# Patient Record
Sex: MALE | Race: WHITE | Employment: UNEMPLOYED | URBAN - METROPOLITAN AREA
[De-identification: names, ages, dates, MRNs, and addresses within clinical notes are randomized per-mention and may not be internally consistent; named-entity substitution may affect disease eponyms.]

---

## 2017-02-28 ENCOUNTER — OFFICE VISIT (OUTPATIENT)
Dept: FAMILY MEDICINE CLINIC | Age: 57
End: 2017-02-28

## 2017-02-28 VITALS
HEART RATE: 82 BPM | BODY MASS INDEX: 41.75 KG/M2 | HEIGHT: 73 IN | TEMPERATURE: 97.2 F | WEIGHT: 315 LBS | OXYGEN SATURATION: 98 % | SYSTOLIC BLOOD PRESSURE: 172 MMHG | DIASTOLIC BLOOD PRESSURE: 104 MMHG | RESPIRATION RATE: 15 BRPM

## 2017-02-28 DIAGNOSIS — R06.83 SNORING: ICD-10-CM

## 2017-02-28 DIAGNOSIS — E11.9 DM TYPE 2, GOAL HBA1C < 7% (HCC): Primary | ICD-10-CM

## 2017-02-28 DIAGNOSIS — L30.9 DERMATITIS: ICD-10-CM

## 2017-02-28 DIAGNOSIS — I25.810 CORONARY ARTERY DISEASE INVOLVING CORONARY BYPASS GRAFT OF NATIVE HEART WITHOUT ANGINA PECTORIS: ICD-10-CM

## 2017-02-28 DIAGNOSIS — E11.29 MICROALBUMINURIA DUE TO TYPE 2 DIABETES MELLITUS (HCC): ICD-10-CM

## 2017-02-28 DIAGNOSIS — I10 ESSENTIAL HYPERTENSION: ICD-10-CM

## 2017-02-28 DIAGNOSIS — J43.8 OTHER EMPHYSEMA (HCC): ICD-10-CM

## 2017-02-28 DIAGNOSIS — R80.9 MICROALBUMINURIA DUE TO TYPE 2 DIABETES MELLITUS (HCC): ICD-10-CM

## 2017-02-28 DIAGNOSIS — E78.00 HYPERCHOLESTEREMIA: ICD-10-CM

## 2017-02-28 DIAGNOSIS — I10 ESSENTIAL HYPERTENSION WITH GOAL BLOOD PRESSURE LESS THAN 140/90: ICD-10-CM

## 2017-02-28 RX ORDER — ALBUTEROL SULFATE 90 UG/1
2 AEROSOL, METERED RESPIRATORY (INHALATION)
Qty: 1 INHALER | Refills: 3 | Status: SHIPPED | OUTPATIENT
Start: 2017-02-28

## 2017-02-28 RX ORDER — CLOBETASOL PROPIONATE 0.5 MG/G
OINTMENT TOPICAL 2 TIMES DAILY
Qty: 60 G | Refills: 2 | Status: SHIPPED | OUTPATIENT
Start: 2017-02-28

## 2017-02-28 NOTE — PROGRESS NOTES
1. Have you been to the ER, urgent care clinic since your last visit? Hospitalized since your last visit? No    2. Have you seen or consulted any other health care providers outside of the [address] [place] [address] since your last visit? Include any pap smears or colon screening.  No      Patient has not eaten , checked his sugars, or took any meds today

## 2017-02-28 NOTE — MR AVS SNAPSHOT
Visit Information Date & Time Provider Department Dept. Phone Encounter #  
 2/28/2017  3:15 PM Kait Johnson, [address] [facility] [phone] Follow-up Instructions Return in about 3 weeks (around 3/21/2017). Upcoming Health Maintenance Date Due Hepatitis C Screening 1960 FOOT EXAM Q1 9/24/1970 EYE EXAM RETINAL OR DILATED Q1 9/24/1970 Pneumococcal 19-64 Medium Risk (1 of 1 - PPSV23) 9/24/1979 DTaP/Tdap/Td series (1 - Tdap) 9/24/1981 FOBT Q 1 YEAR AGE 50-75 9/24/2010 INFLUENZA AGE 9 TO ADULT 8/1/2016 HEMOGLOBIN A1C Q6M 5/17/2017 MICROALBUMIN Q1 8/24/2017 LIPID PANEL Q1 8/24/2017 Allergies as of 2/28/2017  Review Complete On: 2/28/2017 By: Kait Johnson MD  
 No Known Allergies Current Immunizations  Never Reviewed No immunizations on file. Not reviewed this visit You Were Diagnosed With   
  
 Codes Comments DM type 2, goal HbA1c < 7% ([facility])    -  Primary ICD-10-CM: E11.9 ICD-9-CM: 250.00 Essential hypertension with goal blood pressure less than 140/90     ICD-10-CM: I10 
ICD-9-CM: 401.9 Microalbuminuria due to type 2 diabetes mellitus ([facility]     ICD-10-CM: E11.29, R80.9 ICD-9-CM: 250.00, 791.0 Coronary artery disease involving coronary bypass graft of native heart without angina pectoris     ICD-10-CM: I25.810 ICD-9-CM: 414.05 Essential hypertension     ICD-10-CM: I10 
ICD-9-CM: 401.9 Hypercholesteremia     ICD-10-CM: E78.00 ICD-9-CM: 272.0 Snoring     ICD-10-CM: R06.83 
ICD-9-CM: 786.09 Other emphysema ([facility]     ICD-10-CM: J43.8 ICD-9-CM: 492.8 Dermatitis     ICD-10-CM: L30.9 ICD-9-CM: 692.9 Vitals BP  
  
  
  
  
  
 (!) 172/104 (BP 1 Location: Right arm) Vitals History BMI and BSA Data Body Mass Index Body Surface Area  
 42.48 kg/m 2 2.74 m 2 Your Updated Medication List  
  
   
 This list is accurate as of: 2/28/17  4:23 PM.  Always use your most recent med list.  
  
  
  
  
 albuterol 90 mcg/actuation inhaler Commonly known as:  PROVENTIL HFA, VENTOLIN HFA, PROAIR HFA Take 2 Puffs by inhalation every six (6) hours as needed for Wheezing. aspirin delayed-release 81 mg tablet Take  by mouth daily. atorvastatin 80 mg tablet Commonly known as:  LIPITOR Take 1 Tab by mouth daily. cholecalciferol 1,000 unit tablet Commonly known as:  VITAMIN D3 Take 2 Tabs by mouth daily. clobetasol 0.05 % ointment Commonly known as:  Ulysses Donato Apply  to affected area two (2) times a day. FLUoxetine 20 mg capsule Commonly known as:  PROzac Take 1 Cap by mouth daily. insulin glargine 100 unit/mL injection Commonly known as:  LANTUS  
65 Units by SubCUTAneous route daily. Insulin Syringes (Disposable) 1 mL Syrg Use once a day. Diagnosis 250.00  
  
 lisinopril 40 mg tablet Commonly known as:  Donnald Code Take 1 Tab by mouth daily. metFORMIN 1,000 mg tablet Commonly known as:  GLUCOPHAGE Take 1 Tab by mouth two (2) times daily (with meals). metoprolol tartrate 25 mg tablet Commonly known as:  LOPRESSOR Take 1 Tab by mouth two (2) times a day. sAXagliptin 2.5 mg tablet Commonly known as:  ONGLYZA Take 1 Tab by mouth daily. Prescriptions Printed Refills  
 albuterol (PROVENTIL HFA, VENTOLIN HFA, PROAIR HFA) 90 mcg/actuation inhaler 3 Sig: Take 2 Puffs by inhalation every six (6) hours as needed for Wheezing. Class: Print Route: Inhalation  
 clobetasol (TEMOVATE) 0.05 % ointment 2 Sig: Apply  to affected area two (2) times a day. Class: Print Route: Topical  
  
We Performed the Following REFERRAL TO SLEEP STUDIES [REF99 Custom] Comments:  
 Please evaluate patient for snoring Follow-up Instructions Return in about 3 weeks (around 3/21/2017). To-Do List   
 02/28/2017 Lab:  CBC WITH AUTOMATED DIFF   
  
 02/28/2017 Lab:  HEMOGLOBIN A1C WITH EAG   
  
 02/28/2017 Lab:  LIPID PANEL   
  
 02/28/2017 Lab:  METABOLIC PANEL, COMPREHENSIVE Referral Information Referral ID Referred By Referred To  
  
 [id number] Ellen ERWIN Not Available Visits Status Start Date End Date 1 New Request 2/28/17 2/28/18 If your referral has a status of pending review or denied, additional information will be sent to support the outcome of this decision. Introducing [facility]! Dear Armani Corona: 
Thank you for requesting a [facility] account. Our records indicate that you already have an active [facility] account. You can access your account anytime at [email or web address] Did you know that you can access your hospital and ER discharge instructions at any time in [facility]? You can also review all of your test results from your hospital stay or ER visit. Additional Information If you have questions, please visit the Frequently Asked Questions section of the [facility] website at [email or web address] Remember, [facility] is NOT to be used for urgent needs. For medical emergencies, dial 911. Now available from your iPhone and Android! Please provide this summary of care documentation to your next provider. Your primary care clinician is listed as Jake Brooks. If you have any questions after today's visit, please call [phone].

## 2017-02-28 NOTE — PROGRESS NOTES
Duc Jimenez is a 64 y.o.  male and presents with     Chief Complaint   Patient presents with    Diabetes    Hypertension    Cholesterol Problem    Coronary Artery Disease    Sleep Problem    Rash    Nicotine Dependence       Pt feels stressed today as the pace where he works as [employer] is being sold and he is not sure what is going to happen with his jib and stay in 2 weeks. He did not take any meds today. He had slight headaches that has resolved  Pt says sugars are doing better. Pt does snore . He has been smoking for 30 years. Has mild CUEVAS. Pt has rash on his arms ,he is not sure what has caused it. Past Medical History:   Diagnosis Date    Coronary artery disease     Depression     DM (diabetes mellitus screen)     Heart disease     Hyperlipidemia     Kidney calculi      Past Surgical History:   Procedure Laterality Date    HX OTHER SURGICAL      pt stated he had a triple bypass    HX OTHER SURGICAL      excision of right benign adrenal mass      Current Outpatient Prescriptions   Medication Sig    albuterol (PROVENTIL HFA, VENTOLIN HFA, PROAIR HFA) 90 mcg/actuation inhaler Take 2 Puffs by inhalation every six (6) hours as needed for Wheezing.  clobetasol (TEMOVATE) 0.05 % ointment Apply  to affected area two (2) times a day.  sAXagliptin (ONGLYZA) 2.5 mg tablet Take 1 Tab by mouth daily.  lisinopril (PRINIVIL, ZESTRIL) 40 mg tablet Take 1 Tab by mouth daily.  insulin glargine (LANTUS) 100 unit/mL injection 65 Units by SubCUTAneous route daily.  FLUoxetine (PROZAC) 20 mg capsule Take 1 Cap by mouth daily.  Cholecalciferol, Vitamin D3, (VITAMIN D3) 1,000 unit tablet Take 2 Tabs by mouth daily.  Insulin Syringes, Disposable, 1 mL syrg Use once a day. Diagnosis 250.00    atorvastatin (LIPITOR) 80 mg tablet Take 1 Tab by mouth daily.  metFORMIN (GLUCOPHAGE) 1,000 mg tablet Take 1 Tab by mouth two (2) times daily (with meals).     metoprolol tartrate (LOPRESSOR) 25 mg tablet Take 1 Tab by mouth two (2) times a day.  aspirin delayed-release 81 mg tablet Take  by mouth daily. No current facility-administered medications for this visit. Health Maintenance   Topic Date Due    Hepatitis C Screening  1960    FOOT EXAM Q1  09/24/1970    EYE EXAM RETINAL OR DILATED Q1  09/24/1970    Pneumococcal 19-64 Medium Risk (1 of 1 - PPSV23) 09/24/1979    DTaP/Tdap/Td series (1 - Tdap) 09/24/1981    FOBT Q 1 YEAR AGE 50-75  09/24/2010    INFLUENZA AGE 9 TO ADULT  08/01/2016    HEMOGLOBIN A1C Q6M  05/17/2017    MICROALBUMIN Q1  08/24/2017    LIPID PANEL Q1  08/24/2017       There is no immunization history on file for this patient. No LMP for male patient. Allergies and Intolerances:   No Known Allergies    Family History:   No family history on file. Social History:   He  reports that he has been smoking. He has been smoking about 0.25 packs per day. He has never used smokeless tobacco.  He  reports that he does not drink alcohol.             Review of Systems: pos pofr snoring  General: negative for - chills, fatigue, fever, weight change  Psych: negative for - anxiety, depression, irritability or mood swings  ENT: negative for - headaches, hearing change, nasal congestion, oral lesions, sneezing or sore throat  Heme/ Lymph: negative for - bleeding problems, bruising, pallor or swollen lymph nodes  Endo: negative for - hot flashes, polydipsia/polyuria or temperature intolerance  Resp: negative for - cough, pos for shortness of breath or wheezing  CV: negative for - chest pain, edema or palpitations  GI: negative for - abdominal pain, change in bowel habits, constipation, diarrhea or nausea/vomiting  : negative for - dysuria, hematuria, incontinence, pelvic pain or vulvar/vaginal symptoms  MSK: negative for - joint pain, joint swelling or muscle pain  Neuro: negative for - confusion, headaches, seizures or weakness  Derm: negative for - dry skin, hair changes, rash or skin lesion changes,pso for rash on arms          Physical:   Vitals:   Vitals:    02/28/17 1519 02/28/17 1525   BP: (!) 165/100 (!) 172/104   Pulse: 82    Resp: 15    Temp: 97.2 °F (36.2 °C)    TempSrc: Oral    SpO2: 98%    Weight: 322 lb (146.1 kg)    Height: 6' 1\" (1.854 m)            Exam:   HEENT- atraumatic,normocephalic, awake, oriented, well nourished,obese  Neck - supple,no enlarged lymph nodes, no JVD, no thyromegaly  Chest- CTA, no rhonchi, no crackles,mild wheezing  Heart- rrr, no murmurs / gallop/rub  Abdomen- soft,BS+,NT, no hepatosplenomegaly  Ext - no c/c/edema   Neuro- no focal deficits. Power 5/5 all extremities  Skin - scaly rash on arms , ? Eczema versus psoriasis          Review of Data:   LABS:   Lab Results   Component Value Date/Time    WBC 8.2 08/24/2016 10:00 AM    HGB 13.7 08/24/2016 10:00 AM    HCT 42.6 08/24/2016 10:00 AM    PLATELET 864 57/00/0798 10:00 AM     Lab Results   Component Value Date/Time    Sodium 142 12/15/2016 02:41 PM    Potassium 4.2 12/15/2016 02:41 PM    Chloride 108 12/15/2016 02:41 PM    CO2 30 12/15/2016 02:41 PM    Glucose 162 12/15/2016 02:41 PM    BUN 13 12/15/2016 02:41 PM    Creatinine 0.78 12/15/2016 02:41 PM     Lab Results   Component Value Date/Time    Cholesterol, total 120 08/24/2016 10:00 AM    HDL Cholesterol 34 08/24/2016 10:00 AM    LDL, calculated 23.8 08/24/2016 10:00 AM    Triglyceride 311 08/24/2016 10:00 AM     No results found for: GPT        Impression / Plan:        ICD-10-CM ICD-9-CM    1. DM type 2, goal HbA1c < 7% (HCC) E11.9 250.00 CBC WITH AUTOMATED DIFF      METABOLIC PANEL, COMPREHENSIVE      LIPID PANEL      HEMOGLOBIN A1C WITH EAG   2. Essential hypertension with goal blood pressure less than 140/90 I10 401.9    3. Microalbuminuria due to type 2 diabetes mellitus (HCC) E11.29 250.00     R80.9 791.0    4. Coronary artery disease involving coronary bypass graft of native heart without angina pectoris I25.810 414.05    5. Essential hypertension I10 401.9    6. Hypercholesteremia E78.00 272.0    7. Snoring R06.83 786.09 REFERRAL TO SLEEP STUDIES   8. Other emphysema ([facility] J43.8 492.8 albuterol (PROVENTIL HFA, VENTOLIN HFA, PROAIR HFA) 90 mcg/actuation inhaler   9. Dermatitis L30.9 692.9 clobetasol (TEMOVATE) 0.05 % ointment     Stop smoking. Asked pt take his blood pressure meds. Explained to patient risk benefits of the medications. Advised patient to stop meds if having any side effects. Pt verbalized understanding of the instructions. I have discussed the diagnosis with the patient and the intended plan as seen in the above orders. The patient has received an after-visit summary and questions were answered concerning future plans. I have discussed medication side effects and warnings with the patient as well. I have reviewed the plan of care with the patient, accepted their input and they are in agreement with the treatment goals. Reviewed plan of care. Patient has provided input and agrees with goals.     Follow-up Disposition: Not on José Mckeon MD

## 2017-03-02 ENCOUNTER — HOSPITAL ENCOUNTER (OUTPATIENT)
Dept: LAB | Age: 57
Discharge: HOME | End: 2017-03-02
Payer: COMMERCIAL

## 2017-03-02 DIAGNOSIS — E11.9 DM TYPE 2, GOAL HBA1C < 7% (HCC): ICD-10-CM

## 2017-03-02 LAB
ALBUMIN SERPL BCP-MCNC: 3.4 G/DL (ref 3.4–5)
ALBUMIN/GLOB SERPL: 1.2 {RATIO} (ref 0.8–1.7)
ALP SERPL-CCNC: 123 U/L (ref 45–117)
ALT SERPL-CCNC: 28 U/L (ref 16–61)
ANION GAP BLD CALC-SCNC: 10 MMOL/L (ref 3–18)
AST SERPL W P-5'-P-CCNC: 18 U/L (ref 15–37)
BASOPHILS # BLD AUTO: 0 K/UL (ref 0–0.06)
BASOPHILS # BLD: 1 % (ref 0–2)
BILIRUB SERPL-MCNC: 0.3 MG/DL (ref 0.2–1)
BUN SERPL-MCNC: 16 MG/DL (ref 7–18)
BUN/CREAT SERPL: 16 (ref 12–20)
CALCIUM SERPL-MCNC: 8.8 MG/DL (ref 8.5–10.1)
CHLORIDE SERPL-SCNC: 106 MMOL/L (ref 100–108)
CHOLEST SERPL-MCNC: 81 MG/DL
CO2 SERPL-SCNC: 26 MMOL/L (ref 21–32)
CREAT SERPL-MCNC: 1.01 MG/DL (ref 0.6–1.3)
DIFFERENTIAL METHOD BLD: ABNORMAL
EOSINOPHIL # BLD: 0.4 K/UL (ref 0–0.4)
EOSINOPHIL NFR BLD: 6 % (ref 0–5)
ERYTHROCYTE [DISTWIDTH] IN BLOOD BY AUTOMATED COUNT: 14.6 % (ref 11.6–14.5)
EST. AVERAGE GLUCOSE BLD GHB EST-MCNC: 189 MG/DL
GLOBULIN SER CALC-MCNC: 2.9 G/DL (ref 2–4)
GLUCOSE SERPL-MCNC: 321 MG/DL (ref 74–99)
HBA1C MFR BLD: 8.2 % (ref 4.2–5.6)
HCT VFR BLD AUTO: 41.8 % (ref 36–48)
HDLC SERPL-MCNC: 25 MG/DL (ref 40–60)
HDLC SERPL: 3.2 {RATIO} (ref 0–5)
HGB BLD-MCNC: 13.4 G/DL (ref 13–16)
LDLC SERPL CALC-MCNC: ABNORMAL MG/DL (ref 0–100)
LIPID PROFILE,FLP: ABNORMAL
LYMPHOCYTES # BLD AUTO: 33 % (ref 21–52)
LYMPHOCYTES # BLD: 2.1 K/UL (ref 0.9–3.6)
MCH RBC QN AUTO: 28.1 PG (ref 24–34)
MCHC RBC AUTO-ENTMCNC: 32.1 G/DL (ref 31–37)
MCV RBC AUTO: 87.6 FL (ref 74–97)
MONOCYTES # BLD: 0.6 K/UL (ref 0.05–1.2)
MONOCYTES NFR BLD AUTO: 10 % (ref 3–10)
NEUTS SEG # BLD: 3.3 K/UL (ref 1.8–8)
NEUTS SEG NFR BLD AUTO: 50 % (ref 40–73)
PLATELET # BLD AUTO: 186 K/UL (ref 135–420)
PMV BLD AUTO: 9 FL (ref 9.2–11.8)
POTASSIUM SERPL-SCNC: 4.5 MMOL/L (ref 3.5–5.5)
PROT SERPL-MCNC: 6.3 G/DL (ref 6.4–8.2)
RBC # BLD AUTO: 4.77 M/UL (ref 4.7–5.5)
SODIUM SERPL-SCNC: 142 MMOL/L (ref 136–145)
TRIGL SERPL-MCNC: 321 MG/DL (ref ?–150)
VLDLC SERPL CALC-MCNC: ABNORMAL MG/DL
WBC # BLD AUTO: 6.5 K/UL (ref 4.6–13.2)

## 2017-03-02 PROCEDURE — 85025 COMPLETE CBC W/AUTO DIFF WBC: CPT | Performed by: INTERNAL MEDICINE

## 2017-03-02 PROCEDURE — 36415 COLL VENOUS BLD VENIPUNCTURE: CPT | Performed by: INTERNAL MEDICINE

## 2017-03-02 PROCEDURE — 80061 LIPID PANEL: CPT | Performed by: INTERNAL MEDICINE

## 2017-03-02 PROCEDURE — 83036 HEMOGLOBIN GLYCOSYLATED A1C: CPT | Performed by: INTERNAL MEDICINE

## 2017-03-02 PROCEDURE — 80053 COMPREHEN METABOLIC PANEL: CPT | Performed by: INTERNAL MEDICINE

## 2017-03-21 ENCOUNTER — OFFICE VISIT (OUTPATIENT)
Dept: FAMILY MEDICINE CLINIC | Age: 57
End: 2017-03-21

## 2017-03-21 VITALS
DIASTOLIC BLOOD PRESSURE: 107 MMHG | TEMPERATURE: 97.1 F | SYSTOLIC BLOOD PRESSURE: 187 MMHG | OXYGEN SATURATION: 97 % | RESPIRATION RATE: 17 BRPM | HEART RATE: 73 BPM | WEIGHT: 315 LBS | HEIGHT: 73 IN | BODY MASS INDEX: 41.75 KG/M2

## 2017-03-21 DIAGNOSIS — L40.9 PSORIASIS: ICD-10-CM

## 2017-03-21 DIAGNOSIS — E11.9 DM TYPE 2, GOAL HBA1C < 7% (HCC): Primary | ICD-10-CM

## 2017-03-21 DIAGNOSIS — I10 ESSENTIAL HYPERTENSION WITH GOAL BLOOD PRESSURE LESS THAN 140/90: ICD-10-CM

## 2017-03-21 RX ORDER — METOPROLOL TARTRATE 50 MG/1
50 TABLET ORAL 2 TIMES DAILY
Qty: 60 TAB | Refills: 3 | Status: SHIPPED | OUTPATIENT
Start: 2017-03-21

## 2017-03-21 RX ORDER — HYDROCHLOROTHIAZIDE 25 MG/1
25 TABLET ORAL DAILY
Qty: 30 TAB | Refills: 3 | Status: SHIPPED | OUTPATIENT
Start: 2017-03-21 | End: 2017-05-22 | Stop reason: ALTCHOICE

## 2017-03-21 NOTE — PROGRESS NOTES
Rosanna Kiran is a 64 y.o.  male and presents with     Chief Complaint   Patient presents with    Diabetes    Hypertension    Sleep Problem    Cholesterol Problem    Rash       Pt is here for lab results. Pt says he is taking meds for HTN, DM and chol,but he did not take his meds today  Pt has rash on his arms . Pt says his Dad had psoriasis,  Pt snores. He was supposed to get sleep study ,but there was mix up so it as not done. Past Medical History:   Diagnosis Date    Coronary artery disease     Depression     DM (diabetes mellitus screen)     Heart disease     Hyperlipidemia     Kidney calculi      Past Surgical History:   Procedure Laterality Date    HX OTHER SURGICAL      pt stated he had a triple bypass    HX OTHER SURGICAL      excision of right benign adrenal mass      Current Outpatient Prescriptions   Medication Sig    metoprolol tartrate (LOPRESSOR) 50 mg tablet Take 1 Tab by mouth two (2) times a day.  sAXagliptin (ONGLYZA) 5 mg tab tablet Take 1 Tab by mouth daily.  hydroCHLOROthiazide (HYDRODIURIL) 25 mg tablet Take 1 Tab by mouth daily.  albuterol (PROVENTIL HFA, VENTOLIN HFA, PROAIR HFA) 90 mcg/actuation inhaler Take 2 Puffs by inhalation every six (6) hours as needed for Wheezing.  clobetasol (TEMOVATE) 0.05 % ointment Apply  to affected area two (2) times a day.  lisinopril (PRINIVIL, ZESTRIL) 40 mg tablet Take 1 Tab by mouth daily.  insulin glargine (LANTUS) 100 unit/mL injection 65 Units by SubCUTAneous route daily.  FLUoxetine (PROZAC) 20 mg capsule Take 1 Cap by mouth daily.  Cholecalciferol, Vitamin D3, (VITAMIN D3) 1,000 unit tablet Take 2 Tabs by mouth daily.  Insulin Syringes, Disposable, 1 mL syrg Use once a day. Diagnosis 250.00    atorvastatin (LIPITOR) 80 mg tablet Take 1 Tab by mouth daily.  metFORMIN (GLUCOPHAGE) 1,000 mg tablet Take 1 Tab by mouth two (2) times daily (with meals).     aspirin delayed-release 81 mg tablet Take  by mouth daily. No current facility-administered medications for this visit. Health Maintenance   Topic Date Due    Hepatitis C Screening  1960    FOOT EXAM Q1  09/24/1970    EYE EXAM RETINAL OR DILATED Q1  09/24/1970    Pneumococcal 19-64 Medium Risk (1 of 1 - PPSV23) 09/24/1979    DTaP/Tdap/Td series (1 - Tdap) 09/24/1981    FOBT Q 1 YEAR AGE 50-75  09/24/2010    INFLUENZA AGE 9 TO ADULT  08/01/2016    MICROALBUMIN Q1  08/24/2017    HEMOGLOBIN A1C Q6M  09/02/2017    LIPID PANEL Q1  03/02/2018       There is no immunization history on file for this patient. No LMP for male patient. Allergies and Intolerances:   No Known Allergies    Family History:   No family history on file. Social History:   He  reports that he has been smoking. He has been smoking about 0.25 packs per day. He has never used smokeless tobacco.  He  reports that he does not drink alcohol.             Review of Systems: pos for snoring  General: negative for - chills, fatigue, fever, weight change  Psych: negative for - anxiety, depression, irritability or mood swings  ENT: negative for - headaches, hearing change, nasal congestion, oral lesions, sneezing or sore throat  Heme/ Lymph: negative for - bleeding problems, bruising, pallor or swollen lymph nodes  Endo: negative for - hot flashes, polydipsia/polyuria or temperature intolerance  Resp: negative for - cough, shortness of breath or wheezing  CV: negative for - chest pain, edema or palpitations  GI: negative for - abdominal pain, change in bowel habits, constipation, diarrhea or nausea/vomiting  : negative for - dysuria, hematuria, incontinence, pelvic pain or vulvar/vaginal symptoms  MSK: negative for - joint pain, joint swelling or muscle pain  Neuro: negative for - confusion, headaches, seizures or weakness  Derm: negative for - dry skin, hair changes, rash or skin lesion changes, pos for rash on arms and abdominal wall          Physical:   Vitals:   Vitals: 03/21/17 1109   BP: (!) 187/107   Pulse: 73   Resp: 17   Temp: 97.1 °F (36.2 °C)   TempSrc: Oral   SpO2: 97%   Weight: 322 lb (146.1 kg)   Height: 6' 1\" (1.854 m)           Exam:   HEENT- atraumatic,normocephalic, awake, oriented, well nourished  Neck - supple,no enlarged lymph nodes, no JVD, no thyromegaly  Chest- CTA, no rhonchi, no crackles  Heart- rrr, no murmurs / gallop/rub  Abdomen- soft,BS+,NT, no hepatosplenomegaly  Ext - no c/c/edema ,psoriasis on arms and abdominal wall  Neuro- no focal deficits. Power 5/5 all extremities  Skin - warm,dry, no obvious rashes. Review of Data:   LABS:   Lab Results   Component Value Date/Time    WBC 6.5 03/02/2017 11:17 AM    HGB 13.4 03/02/2017 11:17 AM    HCT 41.8 03/02/2017 11:17 AM    PLATELET 846 07/20/2469 11:17 AM     Lab Results   Component Value Date/Time    Sodium 142 03/02/2017 11:17 AM    Potassium 4.5 03/02/2017 11:17 AM    Chloride 106 03/02/2017 11:17 AM    CO2 26 03/02/2017 11:17 AM    Glucose 321 03/02/2017 11:17 AM    BUN 16 03/02/2017 11:17 AM    Creatinine 1.01 03/02/2017 11:17 AM     Lab Results   Component Value Date/Time    Cholesterol, total 81 03/02/2017 11:17 AM    HDL Cholesterol 25 03/02/2017 11:17 AM    LDL, calculated Cannot be calulated 03/02/2017 11:17 AM    Triglyceride 321 03/02/2017 11:17 AM     No results found for: GPT        Impression / Plan:        ICD-10-CM ICD-9-CM    1. DM type 2, goal HbA1c < 7% (HCC) E11.9 250.00 sAXagliptin (ONGLYZA) 5 mg tab tablet   2. Essential hypertension with goal blood pressure less than 140/90 I10 401.9 metoprolol tartrate (LOPRESSOR) 50 mg tablet      hydroCHLOROthiazide (HYDRODIURIL) 25 mg tablet   3. Psoriasis L40.9 696.1 REFERRAL TO DERMATOLOGY     HYperchol - continue statins    Snoring - will make appt with another sleep specilist as he did not like the office of the first one. Explained to patient risk benefits of the medications. Advised patient to stop meds if having any side effects. Pt verbalized understanding of the instructions. I have discussed the diagnosis with the patient and the intended plan as seen in the above orders. The patient has received an after-visit summary and questions were answered concerning future plans. I have discussed medication side effects and warnings with the patient as well. I have reviewed the plan of care with the patient, accepted their input and they are in agreement with the treatment goals. Reviewed plan of care. Patient has provided input and agrees with goals.     Follow-up Disposition: Not on Susan Garza MD

## 2017-03-24 DIAGNOSIS — F32.1 MODERATE SINGLE CURRENT EPISODE OF MAJOR DEPRESSIVE DISORDER (HCC): ICD-10-CM

## 2017-03-24 DIAGNOSIS — I10 ESSENTIAL HYPERTENSION WITH GOAL BLOOD PRESSURE LESS THAN 140/90: ICD-10-CM

## 2017-03-24 DIAGNOSIS — I25.810 CORONARY ARTERY DISEASE INVOLVING CORONARY BYPASS GRAFT OF NATIVE HEART WITHOUT ANGINA PECTORIS: ICD-10-CM

## 2017-03-26 RX ORDER — FLUOXETINE HYDROCHLORIDE 20 MG/1
20 CAPSULE ORAL DAILY
Qty: 30 CAP | Refills: 2 | Status: SHIPPED | OUTPATIENT
Start: 2017-03-26 | End: 2017-07-03 | Stop reason: SDUPTHER

## 2017-03-26 RX ORDER — METFORMIN HYDROCHLORIDE 1000 MG/1
1000 TABLET ORAL 2 TIMES DAILY WITH MEALS
Qty: 60 TAB | Refills: 5 | Status: SHIPPED | OUTPATIENT
Start: 2017-03-26 | End: 2017-07-03 | Stop reason: SDUPTHER

## 2017-03-26 RX ORDER — LISINOPRIL 40 MG/1
40 TABLET ORAL DAILY
Qty: 30 TAB | Refills: 2 | Status: SHIPPED | OUTPATIENT
Start: 2017-03-26 | End: 2017-07-03 | Stop reason: SDUPTHER

## 2017-03-28 RX ORDER — FLUOXETINE HYDROCHLORIDE 20 MG/1
20 CAPSULE ORAL DAILY
Qty: 30 CAP | Refills: 2 | Status: CANCELLED | OUTPATIENT
Start: 2017-03-28

## 2017-03-28 RX ORDER — METFORMIN HYDROCHLORIDE 1000 MG/1
1000 TABLET ORAL 2 TIMES DAILY WITH MEALS
Qty: 60 TAB | Refills: 5 | Status: CANCELLED | OUTPATIENT
Start: 2017-03-28

## 2017-03-28 RX ORDER — ATORVASTATIN CALCIUM 80 MG/1
80 TABLET, FILM COATED ORAL DAILY
Qty: 60 TAB | Refills: 3 | Status: CANCELLED | OUTPATIENT
Start: 2017-03-28

## 2017-05-16 DIAGNOSIS — I25.810 CORONARY ARTERY DISEASE INVOLVING CORONARY BYPASS GRAFT OF NATIVE HEART WITHOUT ANGINA PECTORIS: ICD-10-CM

## 2017-05-16 NOTE — TELEPHONE ENCOUNTER
Please print off the script for this medication patient has it filled at the [facility]

## 2017-05-17 RX ORDER — ATORVASTATIN CALCIUM 80 MG/1
80 TABLET, FILM COATED ORAL DAILY
Qty: 60 TAB | Refills: 3 | Status: ON HOLD | OUTPATIENT
Start: 2017-05-17 | End: 2018-08-26

## 2017-05-17 NOTE — TELEPHONE ENCOUNTER
Pt came by to see if rx was written yet. He had to leave but please call when it is ready so he can come and .

## 2017-05-17 NOTE — TELEPHONE ENCOUNTER
Requested Prescriptions     Pending Prescriptions Disp Refills    atorvastatin (LIPITOR) 80 mg tablet 60 Tab 3     Sig: Take 1 Tab by mouth daily.

## 2017-05-22 ENCOUNTER — OFFICE VISIT (OUTPATIENT)
Dept: FAMILY MEDICINE CLINIC | Age: 57
End: 2017-05-22

## 2017-05-22 VITALS
TEMPERATURE: 96.6 F | WEIGHT: 315 LBS | SYSTOLIC BLOOD PRESSURE: 132 MMHG | RESPIRATION RATE: 19 BRPM | DIASTOLIC BLOOD PRESSURE: 69 MMHG | OXYGEN SATURATION: 98 % | HEIGHT: 73 IN | HEART RATE: 82 BPM | BODY MASS INDEX: 41.75 KG/M2

## 2017-05-22 DIAGNOSIS — E11.29 MICROALBUMINURIA DUE TO TYPE 2 DIABETES MELLITUS (HCC): ICD-10-CM

## 2017-05-22 DIAGNOSIS — R60.0 BILATERAL EDEMA OF LOWER EXTREMITY: ICD-10-CM

## 2017-05-22 DIAGNOSIS — E78.5 HYPERLIPIDEMIA, UNSPECIFIED HYPERLIPIDEMIA TYPE: ICD-10-CM

## 2017-05-22 DIAGNOSIS — I10 ESSENTIAL HYPERTENSION WITH GOAL BLOOD PRESSURE LESS THAN 140/90: ICD-10-CM

## 2017-05-22 DIAGNOSIS — I25.810 CORONARY ARTERY DISEASE INVOLVING CORONARY BYPASS GRAFT OF NATIVE HEART WITHOUT ANGINA PECTORIS: ICD-10-CM

## 2017-05-22 DIAGNOSIS — R80.9 MICROALBUMINURIA DUE TO TYPE 2 DIABETES MELLITUS (HCC): ICD-10-CM

## 2017-05-22 DIAGNOSIS — F32.1 MODERATE SINGLE CURRENT EPISODE OF MAJOR DEPRESSIVE DISORDER (HCC): ICD-10-CM

## 2017-05-22 LAB — HBA1C MFR BLD HPLC: 9.2 %

## 2017-05-22 RX ORDER — INSULIN GLARGINE 100 [IU]/ML
70 INJECTION, SOLUTION SUBCUTANEOUS DAILY
Qty: 30 ML | Refills: 5 | Status: SHIPPED | OUTPATIENT
Start: 2017-05-22

## 2017-05-22 RX ORDER — FUROSEMIDE 20 MG/1
20 TABLET ORAL DAILY
Qty: 30 TAB | Refills: 2 | Status: SHIPPED | OUTPATIENT
Start: 2017-05-22

## 2017-05-22 RX ORDER — INSULIN GLARGINE 100 [IU]/ML
70 INJECTION, SOLUTION SUBCUTANEOUS DAILY
Qty: 30 ML | Refills: 5
Start: 2017-05-22 | End: 2017-05-22 | Stop reason: SDUPTHER

## 2017-05-22 NOTE — PROGRESS NOTES
History of Present Illness  Zoltan Gore is a 64 y.o. male who presents today for management of    Chief Complaint   Patient presents with    Diabetes    Hypertension       Patient has sinus issues today. He takes Lantus 65 units daily. Onglyza was added during last visit. Random home blood sugars 150-300. No episodes of hypoglcyemia. Blood pressure improved. HCTZ was added and metoprolol was increased during last visit. He follows a low salt diet. He last took Lipitor one week ago because he was not able to refill. Past Medical History  Past Medical History:   Diagnosis Date    Coronary artery disease     Depression     DM (diabetes mellitus screen)     Heart disease     Hyperlipidemia     Kidney calculi         Surgical History  Past Surgical History:   Procedure Laterality Date    HX OTHER SURGICAL      pt stated he had a triple bypass    HX OTHER SURGICAL      excision of right benign adrenal mass         Current Medications  Current Outpatient Prescriptions   Medication Sig    insulin glargine (LANTUS) 100 unit/mL injection 70 Units by SubCUTAneous route daily.  metFORMIN (GLUCOPHAGE) 1,000 mg tablet Take 1 Tab by mouth two (2) times daily (with meals).  FLUoxetine (PROZAC) 20 mg capsule Take 1 Cap by mouth daily.  lisinopril (PRINIVIL, ZESTRIL) 40 mg tablet Take 1 Tab by mouth daily.  metoprolol tartrate (LOPRESSOR) 50 mg tablet Take 1 Tab by mouth two (2) times a day.  sAXagliptin (ONGLYZA) 5 mg tab tablet Take 1 Tab by mouth daily.  hydroCHLOROthiazide (HYDRODIURIL) 25 mg tablet Take 1 Tab by mouth daily.  albuterol (PROVENTIL HFA, VENTOLIN HFA, PROAIR HFA) 90 mcg/actuation inhaler Take 2 Puffs by inhalation every six (6) hours as needed for Wheezing.  clobetasol (TEMOVATE) 0.05 % ointment Apply  to affected area two (2) times a day.  Cholecalciferol, Vitamin D3, (VITAMIN D3) 1,000 unit tablet Take 2 Tabs by mouth daily.     Insulin Syringes, Disposable, 1 mL syrg Use once a day. Diagnosis 250.00    aspirin delayed-release 81 mg tablet Take  by mouth daily.  atorvastatin (LIPITOR) 80 mg tablet Take 1 Tab by mouth daily. No current facility-administered medications for this visit. Allergies/Drug Reactions  No Known Allergies     Family History  No family history on file. Social History  Social History     Social History    Marital status: UNKNOWN     Spouse name: N/A    Number of children: N/A    Years of education: N/A     Occupational History    Not on file. Social History Main Topics    Smoking status: Current Every Day Smoker     Packs/day: 0.25    Smokeless tobacco: Never Used      Comment: 1 cigarette a day     Alcohol use No      Comment: Former smoker    Drug use: No    Sexual activity: Not on file     Other Topics Concern    Not on file     Social History Narrative       Health Maintenance   Topic Date Due    Hepatitis C Screening  1960    FOOT EXAM Q1  09/24/1970    EYE EXAM RETINAL OR DILATED Q1  09/24/1970    Pneumococcal 19-64 Medium Risk (1 of 1 - PPSV23) 09/24/1979    DTaP/Tdap/Td series (1 - Tdap) 09/24/1981    FOBT Q 1 YEAR AGE 50-75  09/24/2010    INFLUENZA AGE 9 TO ADULT  08/01/2017    MICROALBUMIN Q1  08/24/2017    HEMOGLOBIN A1C Q6M  09/02/2017    LIPID PANEL Q1  03/02/2018       There is no immunization history on file for this patient.     Review of Systems  General ROS: negative for - chills, fatigue or fever  Respiratory ROS: no cough, shortness of breath, or wheezing  Cardiovascular ROS: no chest pain or dyspnea on exertion  Gastrointestinal ROS: no abdominal pain, change in bowel habits, or black or bloody stools  Genito-Urinary ROS: no dysuria, trouble voiding, or hematuria  Musculoskeletal ROS: negative  Neurological ROS: no TIA or stroke symptoms    Physical Exam  Vital signs:   Vitals:    05/22/17 1313   BP: 132/69   Pulse: 82   Resp: 19   Temp: 96.6 °F (35.9 °C)   TempSrc: Oral   SpO2: 98% Weight: 337 lb (152.9 kg)   Height: 6' 1\" (1.854 m)       General: alert, oriented, not in distress  Head: scalp normal, atraumatic  Neck: supple, no JVD, no lymphadenopathy, non-palpable thyroid  Chest/Lungs: clear breath sounds, no wheezing or crackles  Heart: normal rate, regular rhythm, no murmur  Abdomen: soft, non-distended, non-tender, normal bowel sounds, no organomegaly, no masses  Extremities: no focal deformities, pitting bipedal edema  Skin: diabetic frost on both legs and feet  Foot exam: no open cuts, ulcers or wounds. DP full and equal. Sensation decreased with 5.07 g monofilament wire bilaterally. Laboratory/Tests:  POC HbA1c 9.2%    Assessment/Plan:    1. Uncontrolled type 2 diabetes mellitus with microalbuminuria, with long-term current use of insulin (HCC)  - AMB POC HEMOGLOBIN A1C  -  DIABETES FOOT EXAM  - increase insulin glargine (LANTUS) 100 unit/mL injection; 70 Units by SubCUTAneous route daily. Dispense: 30 mL; Refill: 5  - continue metformin and saxagliptin  - advised to check fasting and 2-hr postprandial blood sugars    2. Essential hypertension with goal blood pressure less than 140/90  - improved  - continue metoprolol, lisinopril   - stop HCTZ. Start Lasix for swelling    3. Moderate single current episode of major depressive disorder (HCC)  - stable  - cont fluoxetine    4. Hyperlipidemia, unspecified hyperlipidemia type  - cont lipitor  - repeat lipid panel next visit    5. CAD  - continue aspirin    Check lipid panel, CMP, vitamin D level on next visit. Follow-up Disposition:  Return in about 2 weeks (around 6/5/2017) for dm. I have discussed the diagnosis with the patient and the intended plan as seen in the above orders. The patient has received an after-visit summary and questions were answered concerning future plans. I have discussed medication side effects and warnings with the patient as well.  I have reviewed the plan of care with the patient, accepted their input and they are in agreement with the treatment goals.        Juan Miguel Williamson MD  May 22, 2017

## 2017-05-22 NOTE — PROGRESS NOTES
Patient presents to clinic for routine follow up. Advance Directive:    1. Do you have an advance directive in place? Patient Reply:     2. If not, would you like material regarding how to put one in place? Patient Reply:      Coordination of Care:    1. Have you been to the ER, urgent care clinic since your last visit? Hospitalized since your last visit? No     2. Have you seen or consulted any other health care providers outside of the [address] [place] [address] since your last visit? Include any pap smears or colon screening. No     Depression Screening completed. Learning Assessment completed. Abuse Screening completed. Health Maintenance reviewed and discussed per provider.

## 2017-05-22 NOTE — MR AVS SNAPSHOT
Visit Information Date & Time Provider Department Dept. Phone Encounter #  
 5/22/2017  1:00 PM Cydney Lesch, [address] [address] [id number] Follow-up Instructions Return in about 2 weeks (around 6/5/2017) for dm. Upcoming Health Maintenance Date Due Hepatitis C Screening 1960 FOOT EXAM Q1 9/24/1970 EYE EXAM RETINAL OR DILATED Q1 9/24/1970 Pneumococcal 19-64 Medium Risk (1 of 1 - PPSV23) 9/24/1979 DTaP/Tdap/Td series (1 - Tdap) 9/24/1981 FOBT Q 1 YEAR AGE 50-75 9/24/2010 INFLUENZA AGE 9 TO ADULT 8/1/2017 MICROALBUMIN Q1 8/24/2017 HEMOGLOBIN A1C Q6M 9/2/2017 LIPID PANEL Q1 3/2/2018 Allergies as of 5/22/2017  Review Complete On: 5/22/2017 By: Cydney Lesch, MD  
 No Known Allergies Current Immunizations  Never Reviewed No immunizations on file. Not reviewed this visit You Were Diagnosed With   
  
 Codes Comments Uncontrolled type 2 diabetes mellitus with microalbuminuria, with long-term current use of insulin (HCC)    -  Primary ICD-10-CM: E11.29, E11.65, R80.9, Z79.4 ICD-9-CM: 250.42, 791.0, V58.67 Essential hypertension with goal blood pressure less than 140/90     ICD-10-CM: I10 
ICD-9-CM: 401.9 Moderate single current episode of major depressive disorder (HCC)     ICD-10-CM: F32.1 ICD-9-CM: 296.22 Hyperlipidemia, unspecified hyperlipidemia type     ICD-10-CM: E78.5 ICD-9-CM: 272.4 Coronary artery disease involving coronary bypass graft of native heart without angina pectoris     ICD-10-CM: I25.810 ICD-9-CM: 414.05 Microalbuminuria due to type 2 diabetes mellitus ([facility]     ICD-10-CM: E11.29, R80.9 ICD-9-CM: 250.00, 791.0 Bilateral edema of lower extremity     ICD-10-CM: R60.0 ICD-9-CM: 142. 3 Vitals BP Pulse Temp Resp Height(growth percentile) Weight(growth percentile)  132/69 (BP 1 Location: Right arm, BP Patient Position: Sitting) 82 96.6 °F (35.9 °C) (Oral) 19 6' 1\" (1.854 m) 337 lb (152.9 kg) SpO2 BMI Smoking Status 98% 44.46 kg/m2 Current Every Day Smoker Vitals History BMI and BSA Data Body Mass Index Body Surface Area 44.46 kg/m 2 2.81 m 2 Your Updated Medication List  
  
   
This list is accurate as of: 5/22/17  1:43 PM.  Always use your most recent med list.  
  
  
  
  
 albuterol 90 mcg/actuation inhaler Commonly known as:  PROVENTIL HFA, VENTOLIN HFA, PROAIR HFA Take 2 Puffs by inhalation every six (6) hours as needed for Wheezing. aspirin delayed-release 81 mg tablet Take  by mouth daily. atorvastatin 80 mg tablet Commonly known as:  LIPITOR Take 1 Tab by mouth daily. cholecalciferol 1,000 unit tablet Commonly known as:  VITAMIN D3 Take 2 Tabs by mouth daily. clobetasol 0.05 % ointment Commonly known as:  Reynolds Apa Apply  to affected area two (2) times a day. FLUoxetine 20 mg capsule Commonly known as:  PROzac Take 1 Cap by mouth daily. furosemide 20 mg tablet Commonly known as:  LASIX Take 1 Tab by mouth daily. insulin glargine 100 unit/mL injection Commonly known as:  LANTUS  
70 Units by SubCUTAneous route daily. Insulin Syringes (Disposable) 1 mL Syrg Use once a day. Diagnosis 250.00  
  
 lisinopril 40 mg tablet Commonly known as:  Kelley Shawl Take 1 Tab by mouth daily. metFORMIN 1,000 mg tablet Commonly known as:  GLUCOPHAGE Take 1 Tab by mouth two (2) times daily (with meals). metoprolol tartrate 50 mg tablet Commonly known as:  LOPRESSOR Take 1 Tab by mouth two (2) times a day. sAXagliptin 5 mg Tab tablet Commonly known as:  ONGLYZA Take 1 Tab by mouth daily. Prescriptions Printed Refills  
 furosemide (LASIX) 20 mg tablet 2 Sig: Take 1 Tab by mouth daily. Class: Print Route: Oral  
  
We Performed the Following AMB POC HEMOGLOBIN A1C [21134 CPT(R)] HM DIABETES FOOT EXAM [HM7 Custom] Follow-up Instructions Return in about 2 weeks (around 6/5/2017) for dm. Introducing [facility] SERVICES! Dear Deysi Kolb: 
Thank you for requesting a [employer] account. Our records indicate that you already have an active [employer] account. You can access your account anytime at [email or web address]. [email or web address] Did you know that you can access your hospital and ER discharge instructions at any time in [employer]? You can also review all of your test results from your hospital stay or ER visit. Additional Information If you have questions, please visit the Frequently Asked Questions section of the [employer] website at [email or web address] Remember, [employer] is NOT to be used for urgent needs. For medical emergencies, dial 911. Now available from your iPhone and Android! Please provide this summary of care documentation to your next provider. Your primary care clinician is listed as Illene Hodgkin. If you have any questions after today's visit, please call [phone].

## 2017-06-05 ENCOUNTER — HOSPITAL ENCOUNTER (OUTPATIENT)
Dept: LAB | Age: 57
Discharge: HOME | End: 2017-06-05
Payer: OTHER GOVERNMENT

## 2017-06-05 ENCOUNTER — OFFICE VISIT (OUTPATIENT)
Dept: FAMILY MEDICINE CLINIC | Age: 57
End: 2017-06-05

## 2017-06-05 VITALS
SYSTOLIC BLOOD PRESSURE: 139 MMHG | RESPIRATION RATE: 20 BRPM | DIASTOLIC BLOOD PRESSURE: 76 MMHG | HEIGHT: 73 IN | BODY MASS INDEX: 41.75 KG/M2 | OXYGEN SATURATION: 97 % | WEIGHT: 315 LBS | TEMPERATURE: 96.6 F | HEART RATE: 79 BPM

## 2017-06-05 DIAGNOSIS — E78.1 HYPERTRIGLYCERIDEMIA: ICD-10-CM

## 2017-06-05 DIAGNOSIS — Z11.59 NEED FOR HEPATITIS C SCREENING TEST: ICD-10-CM

## 2017-06-05 DIAGNOSIS — E55.9 VITAMIN D DEFICIENCY: ICD-10-CM

## 2017-06-05 DIAGNOSIS — Z23 ENCOUNTER FOR IMMUNIZATION: ICD-10-CM

## 2017-06-05 DIAGNOSIS — Z12.11 SCREENING FOR COLON CANCER: ICD-10-CM

## 2017-06-05 LAB
ALBUMIN SERPL BCP-MCNC: 3.3 G/DL (ref 3.4–5)
ALBUMIN/GLOB SERPL: 1.1 {RATIO} (ref 0.8–1.7)
ALP SERPL-CCNC: 94 U/L (ref 45–117)
ALT SERPL-CCNC: 46 U/L (ref 16–61)
ANION GAP BLD CALC-SCNC: 6 MMOL/L (ref 3–18)
AST SERPL W P-5'-P-CCNC: 31 U/L (ref 15–37)
BILIRUB SERPL-MCNC: 0.3 MG/DL (ref 0.2–1)
BUN SERPL-MCNC: 25 MG/DL (ref 7–18)
BUN/CREAT SERPL: 21 (ref 12–20)
CALCIUM SERPL-MCNC: 9.1 MG/DL (ref 8.5–10.1)
CHLORIDE SERPL-SCNC: 107 MMOL/L (ref 100–108)
CHOLEST SERPL-MCNC: 118 MG/DL
CO2 SERPL-SCNC: 27 MMOL/L (ref 21–32)
CREAT SERPL-MCNC: 1.18 MG/DL (ref 0.6–1.3)
GLOBULIN SER CALC-MCNC: 2.9 G/DL (ref 2–4)
GLUCOSE POC: 261 MG/DL
GLUCOSE SERPL-MCNC: 234 MG/DL (ref 74–99)
HDLC SERPL-MCNC: 24 MG/DL (ref 40–60)
HDLC SERPL: 4.9 {RATIO} (ref 0–5)
LDLC SERPL CALC-MCNC: ABNORMAL MG/DL (ref 0–100)
LIPID PROFILE,FLP: ABNORMAL
POTASSIUM SERPL-SCNC: 4.8 MMOL/L (ref 3.5–5.5)
PROT SERPL-MCNC: 6.2 G/DL (ref 6.4–8.2)
SODIUM SERPL-SCNC: 140 MMOL/L (ref 136–145)
TRIGL SERPL-MCNC: 544 MG/DL (ref ?–150)
VLDLC SERPL CALC-MCNC: ABNORMAL MG/DL

## 2017-06-05 PROCEDURE — 80061 LIPID PANEL: CPT | Performed by: INTERNAL MEDICINE

## 2017-06-05 PROCEDURE — 82306 VITAMIN D 25 HYDROXY: CPT | Performed by: INTERNAL MEDICINE

## 2017-06-05 PROCEDURE — 86803 HEPATITIS C AB TEST: CPT | Performed by: INTERNAL MEDICINE

## 2017-06-05 PROCEDURE — 36415 COLL VENOUS BLD VENIPUNCTURE: CPT | Performed by: INTERNAL MEDICINE

## 2017-06-05 PROCEDURE — 80053 COMPREHEN METABOLIC PANEL: CPT | Performed by: INTERNAL MEDICINE

## 2017-06-05 RX ORDER — LANCETS
EACH MISCELLANEOUS
Qty: 200 EACH | Refills: 5 | Status: SHIPPED | OUTPATIENT
Start: 2017-06-05

## 2017-06-05 RX ORDER — INSULIN PUMP SYRINGE, 3 ML
EACH MISCELLANEOUS
Qty: 1 KIT | Refills: 0 | Status: SHIPPED | OUTPATIENT
Start: 2017-06-05

## 2017-06-05 NOTE — PROGRESS NOTES
History of Present Illness  Zoltan Howe is a 64 y.o. male who presents today for management of    Chief Complaint   Patient presents with    Cholesterol Problem    Diabetes    Leg Swelling       Patient was fired from his job last week. He has been getting food from food pantries. Random blood sugar [phone]  He takes Lantus 70 units daily. Problem List  Patient Active Problem List    Diagnosis Date Noted    Moderate single current episode of major depressive disorder ([facility] 12/15/2016    Essential hypertension with goal blood pressure less than 140/90 09/22/2016    Microalbuminuria due to type 2 diabetes mellitus ([facility] 09/22/2016    Coronary artery disease involving coronary bypass graft of native heart without angina pectoris 07/25/2016    Type II diabetes mellitus, uncontrolled ([facility] 07/25/2016    Vitamin D deficiency 07/25/2016       Past Medical History  Past Medical History:   Diagnosis Date    Coronary artery disease     Depression     DM (diabetes mellitus screen)     Heart disease     Hyperlipidemia     Kidney calculi         Surgical History  Past Surgical History:   Procedure Laterality Date    HX OTHER SURGICAL      pt stated he had a triple bypass    HX OTHER SURGICAL      excision of right benign adrenal mass         Current Medications  Current Outpatient Prescriptions   Medication Sig    glucose blood VI test strips (BLOOD GLUCOSE TEST) strip Use as directed    Blood-Glucose Meter monitoring kit Use as directed    Lancets misc Use as directed    furosemide (LASIX) 20 mg tablet Take 1 Tab by mouth daily.  insulin glargine (LANTUS) 100 unit/mL injection 70 Units by SubCUTAneous route daily.  atorvastatin (LIPITOR) 80 mg tablet Take 1 Tab by mouth daily.  metFORMIN (GLUCOPHAGE) 1,000 mg tablet Take 1 Tab by mouth two (2) times daily (with meals).  FLUoxetine (PROZAC) 20 mg capsule Take 1 Cap by mouth daily.     lisinopril (PRINIVIL, ZESTRIL) 40 mg tablet Take 1 Tab by mouth daily.  metoprolol tartrate (LOPRESSOR) 50 mg tablet Take 1 Tab by mouth two (2) times a day.  sAXagliptin (ONGLYZA) 5 mg tab tablet Take 1 Tab by mouth daily.  albuterol (PROVENTIL HFA, VENTOLIN HFA, PROAIR HFA) 90 mcg/actuation inhaler Take 2 Puffs by inhalation every six (6) hours as needed for Wheezing.  clobetasol (TEMOVATE) 0.05 % ointment Apply  to affected area two (2) times a day.  Cholecalciferol, Vitamin D3, (VITAMIN D3) 1,000 unit tablet Take 2 Tabs by mouth daily.  Insulin Syringes, Disposable, 1 mL syrg Use once a day. Diagnosis 250.00    aspirin delayed-release 81 mg tablet Take  by mouth daily. No current facility-administered medications for this visit. Allergies/Drug Reactions  No Known Allergies     Family History  No family history on file. Social History  Social History     Social History    Marital status: UNKNOWN     Spouse name: N/A    Number of children: N/A    Years of education: N/A     Occupational History    Not on file. Social History Main Topics    Smoking status: Current Every Day Smoker     Packs/day: 0.25    Smokeless tobacco: Never Used      Comment: 1 cigarette a day     Alcohol use No      Comment: Former smoker    Drug use: No    Sexual activity: Not on file     Other Topics Concern    Not on file     Social History Narrative       Review of Systems  Negative except leg swelling      Physical Exam  Vital signs:   Vitals:    06/05/17 1306   BP: 139/76   Pulse: 79   Resp: 20   Temp: 96.6 °F (35.9 °C)   TempSrc: Oral   SpO2: 97%   Weight: 332 lb (150.6 kg)   Height: 6' 1\" (1.854 m)       General: alert, oriented, not in distress  Chest/Lungs: clear breath sounds, no wheezing or crackles  Heart: normal rate, regular rhythm, no murmur  Abdomen: soft, non-distended, non-tender, normal bowel sounds, no organomegaly, no masses  Extremities: no focal deformities, no edema    Assessment/Plan:      1.  Uncontrolled type 2 diabetes mellitus with microalbuminuria, with long-term current use of insulin (HCC)  - AMB POC GLUCOSE BLOOD, BY GLUCOSE MONITORING DEVICE  - METABOLIC PANEL, COMPREHENSIVE; Future  - glucose blood VI test strips (BLOOD GLUCOSE TEST) strip; Use as directed  Dispense: 200 Strip; Refill: 5  - Blood-Glucose Meter monitoring kit; Use as directed  Dispense: 1 Kit; Refill: 0  - Lancets misc; Use as directed  Dispense: 200 Each; Refill: 5  - increase lantus to 80 units daily  - continue metformin and Onglyza    2. Vitamin D deficiency  - check VITAMIN D, 25 HYDROXY; Future    3. Hypertriglyceridemia  - control uncertain  - check lipid panel  - cont Lipitor  - LIPID PANEL; Future  - METABOLIC PANEL, COMPREHENSIVE; Future    4. Need for hepatitis C screening test  - HCV AB W/RFLX TO SANDI; Future    5. Leg edema - improved  - continue Lasix  - check BMP    Follow-up Disposition:  Return in about 4 weeks (around 7/3/2017) for dm. I have discussed the diagnosis with the patient and the intended plan as seen in the above orders. The patient has received an after-visit summary and questions were answered concerning future plans. I have discussed medication side effects and warnings with the patient as well. I have reviewed the plan of care with the patient, accepted their input and they are in agreement with the treatment goals.        Van Hubbard MD  June 5, 2017

## 2017-06-05 NOTE — PATIENT INSTRUCTIONS
Vaccine Information Statement    Pneumococcal Polysaccharide Vaccine: What You Need to Know    Many Vaccine Information Statements are available in Hungarian and other languages. See www.immunize.org/vis. Hojas de información Sobre Vacunas están disponibles en español y en muchos otros idiomas. Visite Roberto.si. 1. Why get vaccinated? Vaccination can protect older adults (and some children and younger adults) from pneumococcal disease. Pneumococcal disease is caused by bacteria that can spread from person to person through close contact. It can cause ear infections, and it can also lead to more serious infections of the:   Lungs (pneumonia),   Blood (bacteremia), and   Covering of the brain and spinal cord (meningitis). Meningitis can cause deafness and brain damage, and it can be fatal.      Anyone can get pneumococcal disease, but children under 3years of age, people with certain medical conditions, adults over 72years of age, and cigarette smokers are at the highest risk. About 18,000 older adults die each year from pneumococcal disease in the [place]. Treatment of pneumococcal infections with penicillin and other drugs used to be more effective. But some strains of the disease have become resistant to these drugs. This makes prevention of the disease, through vaccination, even more important. 2. Pneumococcal polysaccharide vaccine (PPSV23)    Pneumococcal polysaccharide vaccine (PPSV23) protects against 23 types of pneumococcal bacteria. It will not prevent all pneumococcal disease. PPSV23 is recommended for:   All adults 72years of age and older,   Anyone 2 through 59years of age with certain long-term health problems,   Anyone 2 through 59years of age with a weakened immune system,   Adults 23 through 59years of age who smoke cigarettes or have asthma. Most people need only one dose of PPSV.   A second dose is recommended for certain high-risk groups. People 72 and older should get a dose even if they have gotten one or more doses of the vaccine before they turned 65. Your healthcare provider can give you more information about these recommendations. Most healthy adults develop protection within 2 to 3 weeks of getting the shot. 3. Some people should not get this vaccine     Anyone who has had a life-threatening allergic reaction to PPSV should not get another dose.  Anyone who has a severe allergy to any component of PPSV should not receive it. Tell your provider if you have any severe allergies.  Anyone who is moderately or severely ill when the shot is scheduled may be asked to wait until they recover before getting the vaccine. Someone with a mild illness can usually be vaccinated.  Children less than 3years of age should not receive this vaccine.  There is no evidence that PPSV is harmful to either a pregnant woman or to her fetus. However, as a precaution, women who need the vaccine should be vaccinated before becoming pregnant, if possible. 4. Risks of a vaccine reaction    With any medicine, including vaccines, there is a chance of side effects. These are usually mild and go away on their own, but serious reactions are also possible. About half of people who get PPSV have mild side effects, such as redness or pain where the shot is given, which go away within about two days. Less than 1 out of 100 people develop a fever, muscle aches, or more severe local reactions. Problems that could happen after any vaccine:     People sometimes faint after a medical procedure, including vaccination. Sitting or lying down for about 15 minutes can help prevent fainting, and injuries caused by a fall. Tell your doctor if you feel dizzy, or have vision changes or ringing in the ears.  Some people get severe pain in the shoulder and have difficulty moving the arm where a shot was given.  This happens very rarely.  Any medication can cause a severe allergic reaction. Such reactions from a vaccine are very rare, estimated at about 1 in a million doses, and would happen within a few minutes to a few hours after the vaccination. As with any medicine, there is a very remote chance of a vaccine causing a serious injury or death. The safety of vaccines is always being monitored. For more information, visit: www.cdc.gov/vaccinesafety/     5. What if there is a serious reaction? What should I look for? Look for anything that concerns you, such as signs of a severe allergic reaction, very high fever, or unusual behavior. Signs of a severe allergic reaction can include hives, swelling of the face and throat, difficulty breathing, a fast heartbeat, dizziness, and weakness. These would usually start a few minutes to a few hours after the vaccination. What should I do? If you think it is a severe allergic reaction or other emergency that cant wait, call 9-1-1 or get to the nearest hospital. Otherwise, call your doctor. Afterward, the reaction should be reported to the Vaccine Adverse Event Reporting System (VAERS). Your doctor might file this report, or you can do it yourself through the VAERS web site at www.vaers. [facility].gov, or by calling 2-758.636.8954. [employer] does not give medical advice. 6. How can I learn more?  Ask your doctor. He or she can give you the vaccine package insert or suggest other sources of information.  Call your local or state health department.    Contact the Centers for Disease Control and Prevention (CDC):  - Call 7-602.332.4055 (1-800-CDC-INFO) or  - Visit CDCs website at [employer] 18 04/24/2015    [facility] and [facility] for Disease Control and Prevention    Office Use Only

## 2017-06-05 NOTE — PROGRESS NOTES
Patient presents to clinic for routine follow up     Advance Directive:    1. Do you have an advance directive in place? Patient Reply:     2. If not, would you like material regarding how to put one in place? Patient Reply:      Coordination of Care:    1. Have you been to the ER, urgent care clinic since your last visit? Hospitalized since your last visit? No    2. Have you seen or consulted any other health care providers outside of the [address] [place] [address] since your last visit? Include any pap smears or colon screening. No    Depression Screening completed. Learning Assessment completed. Abuse Screening completed. Health Maintenance reviewed and discussed per provider.

## 2017-06-06 ENCOUNTER — TELEPHONE (OUTPATIENT)
Dept: FAMILY MEDICINE CLINIC | Age: 57
End: 2017-06-06

## 2017-06-06 LAB
25(OH)D3 SERPL-MCNC: 19.4 NG/ML (ref 30–100)
HCV AB S/CO SERPL IA: <0.1 S/CO RATIO (ref 0–0.9)
HCV AB SERPL QL IA: NORMAL

## 2017-06-12 NOTE — PROGRESS NOTES
Triglyceride level is high. Will need to increase statin dose and/or add gemfibrozil. Will discuss with patient on follow-up.

## 2017-07-03 ENCOUNTER — OFFICE VISIT (OUTPATIENT)
Dept: FAMILY MEDICINE CLINIC | Age: 57
End: 2017-07-03

## 2017-07-03 VITALS
TEMPERATURE: 96.3 F | BODY MASS INDEX: 41.75 KG/M2 | RESPIRATION RATE: 18 BRPM | DIASTOLIC BLOOD PRESSURE: 62 MMHG | OXYGEN SATURATION: 95 % | SYSTOLIC BLOOD PRESSURE: 101 MMHG | HEART RATE: 77 BPM | HEIGHT: 73 IN | WEIGHT: 315 LBS

## 2017-07-03 DIAGNOSIS — E78.1 HYPERTRIGLYCERIDEMIA: ICD-10-CM

## 2017-07-03 DIAGNOSIS — E55.9 VITAMIN D DEFICIENCY: ICD-10-CM

## 2017-07-03 DIAGNOSIS — I25.810 CORONARY ARTERY DISEASE INVOLVING CORONARY BYPASS GRAFT OF NATIVE HEART WITHOUT ANGINA PECTORIS: ICD-10-CM

## 2017-07-03 DIAGNOSIS — F32.1 MODERATE SINGLE CURRENT EPISODE OF MAJOR DEPRESSIVE DISORDER (HCC): ICD-10-CM

## 2017-07-03 DIAGNOSIS — R19.8 ALTERNATING CONSTIPATION AND DIARRHEA: ICD-10-CM

## 2017-07-03 DIAGNOSIS — I10 ESSENTIAL HYPERTENSION WITH GOAL BLOOD PRESSURE LESS THAN 140/90: ICD-10-CM

## 2017-07-03 LAB — GLUCOSE POC: 142 MG/DL

## 2017-07-03 RX ORDER — FLUOXETINE HYDROCHLORIDE 20 MG/1
20 CAPSULE ORAL DAILY
Qty: 30 CAP | Refills: 2 | Status: SHIPPED | OUTPATIENT
Start: 2017-07-03

## 2017-07-03 RX ORDER — MELATONIN
2000 DAILY
Qty: 100 TAB | Refills: 2 | Status: SHIPPED | OUTPATIENT
Start: 2017-07-03

## 2017-07-03 RX ORDER — GEMFIBROZIL 600 MG/1
600 TABLET, FILM COATED ORAL 2 TIMES DAILY
Qty: 180 TAB | Refills: 1 | Status: SHIPPED | OUTPATIENT
Start: 2017-07-03

## 2017-07-03 RX ORDER — ASPIRIN 81 MG/1
81 TABLET ORAL DAILY
Qty: 90 TAB | Refills: 2 | Status: SHIPPED | OUTPATIENT
Start: 2017-07-03

## 2017-07-03 RX ORDER — LISINOPRIL 40 MG/1
40 TABLET ORAL DAILY
Qty: 30 TAB | Refills: 2 | Status: SHIPPED | OUTPATIENT
Start: 2017-07-03

## 2017-07-03 RX ORDER — METFORMIN HYDROCHLORIDE 1000 MG/1
1000 TABLET ORAL 2 TIMES DAILY WITH MEALS
Qty: 60 TAB | Refills: 5 | Status: SHIPPED | OUTPATIENT
Start: 2017-07-03

## 2017-07-03 NOTE — MR AVS SNAPSHOT
Visit Information Date & Time Provider Department Dept. Phone Encounter #  
 7/3/2017  1:00 PM Shannon GeorgeDiego 6 [phone] [id number] Follow-up Instructions Return in about 2 months (around 9/3/2017) for dm, htn, hld. Upcoming Health Maintenance Date Due  
 EYE EXAM RETINAL OR DILATED Q1 9/24/1970 DTaP/Tdap/Td series (1 - Tdap) 9/24/1981 FOBT Q 1 YEAR AGE 50-75 9/24/2010 INFLUENZA AGE 9 TO ADULT 8/1/2017 MICROALBUMIN Q1 8/24/2017 HEMOGLOBIN A1C Q6M 11/22/2017 FOOT EXAM Q1 5/22/2018 LIPID PANEL Q1 6/5/2018 Allergies as of 7/3/2017  Review Complete On: 7/3/2017 By: Shannon George MD  
 No Known Allergies Current Immunizations  Reviewed on 6/5/2017 Name Date Pneumococcal Polysaccharide (PPSV-23) 6/5/2017 Not reviewed this visit You Were Diagnosed With   
  
 Codes Comments Uncontrolled type 2 diabetes mellitus with microalbuminuria, with long-term current use of insulin (HCC)    -  Primary ICD-10-CM: E11.29, E11.65, R80.9, Z79.4 ICD-9-CM: 250.42, 791.0, V58.67 Coronary artery disease involving coronary bypass graft of native heart without angina pectoris     ICD-10-CM: I25.810 ICD-9-CM: 414.05 Hypertriglyceridemia     ICD-10-CM: E78.1 ICD-9-CM: 272.1 Vitamin D deficiency     ICD-10-CM: E55.9 ICD-9-CM: 268.9 Essential hypertension with goal blood pressure less than 140/90     ICD-10-CM: I10 
ICD-9-CM: 401.9 Moderate single current episode of major depressive disorder (HCC)     ICD-10-CM: F32.1 ICD-9-CM: 296.22 Alternating constipation and diarrhea     ICD-10-CM: R19.8 ICD-9-CM: 787.99 Vitals BP Pulse Temp Resp Height(growth percentile) Weight(growth percentile) 101/62 (BP 1 Location: Right arm, BP Patient Position: Sitting) 77 96.3 °F (35.7 °C) (Oral) 18 6' 1\" (1.854 m) 326 lb 6.4 oz (148.1 kg) SpO2 BMI Smoking Status 95% 43.06 kg/m2 Current Every Day Smoker BMI and BSA Data Body Mass Index Body Surface Area 43.06 kg/m 2 2.76 m 2 Your Updated Medication List  
  
   
This list is accurate as of: 7/3/17  1:17 PM.  Always use your most recent med list.  
  
  
  
  
 albuterol 90 mcg/actuation inhaler Commonly known as:  PROVENTIL HFA, VENTOLIN HFA, PROAIR HFA Take 2 Puffs by inhalation every six (6) hours as needed for Wheezing. aspirin delayed-release 81 mg tablet Take 1 Tab by mouth daily. atorvastatin 80 mg tablet Commonly known as:  LIPITOR Take 1 Tab by mouth daily. Blood-Glucose Meter monitoring kit Use as directed  
  
 cholecalciferol 1,000 unit tablet Commonly known as:  VITAMIN D3 Take 2 Tabs by mouth daily. clobetasol 0.05 % ointment Commonly known as:  Lenette Kyra Apply  to affected area two (2) times a day. FLUoxetine 20 mg capsule Commonly known as:  PROzac Take 1 Cap by mouth daily. furosemide 20 mg tablet Commonly known as:  LASIX Take 1 Tab by mouth daily. gemfibrozil 600 mg tablet Commonly known as:  LOPID Take 1 Tab by mouth two (2) times a day. glucose blood VI test strips strip Commonly known as:  blood glucose test  
Use as directed  
  
 insulin glargine 100 unit/mL injection Commonly known as:  LANTUS  
70 Units by SubCUTAneous route daily. Insulin Syringes (Disposable) 1 mL Syrg Use once a day. Diagnosis 250.00 Lancets Misc Use as directed  
  
 lisinopril 40 mg tablet Commonly known as:  Mechele Livings Take 1 Tab by mouth daily. metFORMIN 1,000 mg tablet Commonly known as:  GLUCOPHAGE Take 1 Tab by mouth two (2) times daily (with meals). metoprolol tartrate 50 mg tablet Commonly known as:  LOPRESSOR Take 1 Tab by mouth two (2) times a day. sAXagliptin 5 mg Tab tablet Commonly known as:  ONGLYZA Take 1 Tab by mouth daily. Prescriptions Printed Refills  
 cholecalciferol (VITAMIN D3) 1,000 unit tablet 2 Sig: Take 2 Tabs by mouth daily. Class: Print Route: Oral  
 metFORMIN (GLUCOPHAGE) 1,000 mg tablet 5 Sig: Take 1 Tab by mouth two (2) times daily (with meals). Class: Print Route: Oral  
 lisinopril (PRINIVIL, ZESTRIL) 40 mg tablet 2 Sig: Take 1 Tab by mouth daily. Class: Print Route: Oral  
 FLUoxetine (PROZAC) 20 mg capsule 2 Sig: Take 1 Cap by mouth daily. Class: Print Route: Oral  
 aspirin delayed-release 81 mg tablet 2 Sig: Take 1 Tab by mouth daily. Class: Print Route: Oral  
 gemfibrozil (LOPID) 600 mg tablet 1 Sig: Take 1 Tab by mouth two (2) times a day. Class: Print Route: Oral  
  
We Performed the Following AMB POC GLUCOSE BLOOD, BY GLUCOSE MONITORING DEVICE [41253 CPT(R)] REFERRAL TO CARDIOLOGY [FJV34 Custom] Comments:  
 56/M with history of coronary artery disease Follow-up Instructions Return in about 2 months (around 9/3/2017) for dm, htn, hld. Referral Information Referral ID Referred By Referred To  
  
 [id number] July Pruitt Not Available Visits Status Start Date End Date 1 New Request 7/3/17 7/3/18 If your referral has a status of pending review or denied, additional information will be sent to support the outcome of this decision. Patient Instructions Hyperlipidemia: After Your Visit Your Care Instructions Hyperlipidemia is too much fat in your blood. The body has several kinds of fat, including cholesterol and triglycerides. Your body needs fat for many things, such as making new cells. But too much fat in your blood increases your chances of having a heart attack or stroke. You may be able to lower your cholesterol and triglycerides with a heart-healthy diet, exercise, and if needed, medicine.  Your doctor may want you to try lifestyle changes first to see whether they lower the fat in your blood. You may need to take medicine if lifestyle changes do not lower the fat in your blood enough. Follow-up care is a key part of your treatment and safety. Be sure to make and go to all appointments, and call your doctor if you are having problems. Its also a good idea to know your test results and keep a list of the medicines you take. How can you care for yourself at home? Take your medicines · Take your medicines exactly as prescribed. Call your doctor if you think you are having a problem with your medicine. · If you take medicine to lower your cholesterol, go to follow-up visits. You will need to have blood tests. · Do not take large doses of niacin, which is a B vitamin, while taking medicine called statins. It may increase the chance of muscle pain and liver problems. · Talk to your doctor about avoiding grapefruit juice if you are taking statins. Grapefruit juice can raise the level of this medicine in your blood. This could increase side effects. Eat more fruits, vegetables, and fiber · Fruits and vegetables have lots of nutrients that help protect against heart disease, and they have littleif anyfat. Try to eat at least five servings a day. Dark green, deep orange, or yellow fruits and vegetables are healthy choices. · Keep carrots, celery, and other veggies handy for snacks. Buy fruit that is in season and store it where you can see it so that you will be tempted to eat it. Cook dishes that have a lot of veggies in them, such as stir-fries and soups. · Foods high in fiber may reduce your cholesterol and provide important vitamins and minerals. High-fiber foods include whole-grain cereals and breads, oatmeal, beans, brown rice, citrus fruits, and apples. · Buy whole-grain breads and cereals instead of white bread and pastries. Limit saturated fat · Read food labels and try to avoid saturated fat and trans fat. They increase your risk of heart disease. · Use olive or canola oil when you cook. Try cholesterol-lowering spreads, such as Benecol or Take Control. · Bake, broil, grill, or steam foods instead of frying them. · Limit the amount of high-fat meats you eat, including hot dogs and sausages. Cut out all visible fat when you prepare meat. · Eat fish, skinless poultry, and soy products such as tofu instead of high-fat meats. Soybeans may be especially good for your heart. Eat at least two servings of fish a week. Certain fish, such as salmon, contain omega-3 fatty acids, which may help reduce your risk of heart attack. · Choose low-fat or fat-free milk and dairy products. Get exercise, limit alcohol, and quit smoking · Get more exercise. Work with your doctor to set up an exercise program. Even if you can do only a small amount, exercise will help you get stronger, have more energy, and manage your weight and your stress. Walking is an easy way to get exercise. Gradually increase the amount you walk every day. Aim for at least 30 minutes on most days of the week. You also may want to swim, bike, or do other activities. · Limit alcohol to no more than 2 drinks a day for men and 1 drink a day for women. · Do not smoke. If you need help quitting, talk to your doctor about stop-smoking programs and medicines. These can increase your chances of quitting for good. When should you call for help? Call 911 anytime you think you may need emergency care. For example, call if: 
· You have symptoms of a heart attack. These may include: ¨ Chest pain or pressure, or a strange feeling in the chest. 
¨ Sweating. ¨ Shortness of breath. ¨ Nausea or vomiting. ¨ Pain, pressure, or a strange feeling in the back, neck, jaw, or upper belly or in one or both shoulders or arms. ¨ Lightheadedness or sudden weakness. ¨ A fast or irregular heartbeat.  
After you call 911, the  may tell you to chew 1 adult-strength or 2 to 4 low-dose aspirin. Wait for an ambulance. Do not try to drive yourself. · You have signs of a stroke. These may include: 
¨ Sudden numbness, paralysis, or weakness in your face, arm, or leg, especially on only one side of your body. ¨ New problems with walking or balance. ¨ Sudden vision changes. ¨ Drooling or slurred speech. ¨ New problems speaking or understanding simple statements, or feeling confused. ¨ A sudden, severe headache that is different from past headaches. · You passed out (lost consciousness). Call your doctor now or seek immediate medical care if: 
· You have muscle pain or weakness. Watch closely for changes in your health, and be sure to contact your doctor if: 
· You are very tired. · You have an upset stomach, gas, constipation, or belly pain or cramps. Where can you learn more? Go to iMICROQ.be Enter C406 in the search box to learn more about \"Hyperlipidemia: After Your Visit. \"  
© 2910-0138 Healthwise, Incorporated. Care instructions adapted under license by [employer] (which disclaims liability or warranty for this information). This care instruction is for use with your licensed healthcare professional. If you have questions about a medical condition or this instruction, always ask your healthcare professional. [address] any warranty or liability for your use of this information. Content Version: 6.2.154697; Last Revised: October 13, 2011 Introducing [facility]! Dear Guy Rueda: 
Thank you for requesting a [employer] account. Our records indicate that you already have an active [employer] account. You can access your account anytime at [email or web address] Did you know that you can access your hospital and ER discharge instructions at any time in [employer]? You can also review all of your test results from your hospital stay or ER visit. Additional Information If you have questions, please visit the Frequently Asked Questions section of the [employer] website at [email or web address] Remember, "[employer] is NOT to be used for urgent needs. For medical emergencies, dial 911. Now available from your iPhone and Android! Please provide this summary of care documentation to your next provider. Your primary care clinician is listed as Vince Scheuermann. If you have any questions after today's visit, please call [phone].

## 2017-07-03 NOTE — PROGRESS NOTES
Anjana Bani is a 64 y.o. male  Presented to clinic for f/u of diabetes. Pt c/o 2/10 back pain. 1. Have you been to the ER, urgent care clinic since your last visit? Hospitalized since your last visit? No    2. Have you seen or consulted any other health care providers outside of the [address] [place] [address] since your last visit? Include any pap smears or colon screening.  No     Learning Assessment 7/25/2016   PRIMARY LEARNER Patient   HIGHEST LEVEL OF EDUCATION - PRIMARY LEARNER  2 YEARS OF COLLEGE   BARRIERS PRIMARY LEARNER NONE   CO-LEARNER CAREGIVER No   PRIMARY LANGUAGE ENGLISH   LEARNER PREFERENCE PRIMARY DEMONSTRATION     LISTENING   ANSWERED BY patient   RELATIONSHIP SELF

## 2017-07-03 NOTE — PROGRESS NOTES
Medical records from [facility] cardiothoracic surgery clinic. Patient had an incidental finding of multiple subcentimeter lung nodules. Patient is a chronic smoker. He is due to for surveillance chest CT without contrast. Will discuss with patient on follow-up.     Marcus Pederson MD  July 3, 2017

## 2017-07-03 NOTE — PATIENT INSTRUCTIONS
Hyperlipidemia: After Your Visit  Your Care Instructions  Hyperlipidemia is too much fat in your blood. The body has several kinds of fat, including cholesterol and triglycerides. Your body needs fat for many things, such as making new cells. But too much fat in your blood increases your chances of having a heart attack or stroke. You may be able to lower your cholesterol and triglycerides with a heart-healthy diet, exercise, and if needed, medicine. Your doctor may want you to try lifestyle changes first to see whether they lower the fat in your blood. You may need to take medicine if lifestyle changes do not lower the fat in your blood enough. Follow-up care is a key part of your treatment and safety. Be sure to make and go to all appointments, and call your doctor if you are having problems. Its also a good idea to know your test results and keep a list of the medicines you take. How can you care for yourself at home? Take your medicines  · Take your medicines exactly as prescribed. Call your doctor if you think you are having a problem with your medicine. · If you take medicine to lower your cholesterol, go to follow-up visits. You will need to have blood tests. · Do not take large doses of niacin, which is a B vitamin, while taking medicine called statins. It may increase the chance of muscle pain and liver problems. · Talk to your doctor about avoiding grapefruit juice if you are taking statins. Grapefruit juice can raise the level of this medicine in your blood. This could increase side effects. Eat more fruits, vegetables, and fiber  · Fruits and vegetables have lots of nutrients that help protect against heart disease, and they have little--if any--fat. Try to eat at least five servings a day. Dark green, deep orange, or yellow fruits and vegetables are healthy choices. · Keep carrots, celery, and other veggies handy for snacks.  Buy fruit that is in season and store it where you can see it so that you will be tempted to eat it. Cook dishes that have a lot of veggies in them, such as stir-fries and soups. · Foods high in fiber may reduce your cholesterol and provide important vitamins and minerals. High-fiber foods include whole-grain cereals and breads, oatmeal, beans, brown rice, citrus fruits, and apples. · Buy whole-grain breads and cereals instead of white bread and pastries. Limit saturated fat  · Read food labels and try to avoid saturated fat and trans fat. They increase your risk of heart disease. · Use olive or canola oil when you cook. Try cholesterol-lowering spreads, such as Benecol or Take Control. · Bake, broil, grill, or steam foods instead of frying them. · Limit the amount of high-fat meats you eat, including hot dogs and sausages. Cut out all visible fat when you prepare meat. · Eat fish, skinless poultry, and soy products such as tofu instead of high-fat meats. Soybeans may be especially good for your heart. Eat at least two servings of fish a week. Certain fish, such as salmon, contain omega-3 fatty acids, which may help reduce your risk of heart attack. · Choose low-fat or fat-free milk and dairy products. Get exercise, limit alcohol, and quit smoking  · Get more exercise. Work with your doctor to set up an exercise program. Even if you can do only a small amount, exercise will help you get stronger, have more energy, and manage your weight and your stress. Walking is an easy way to get exercise. Gradually increase the amount you walk every day. Aim for at least 30 minutes on most days of the week. You also may want to swim, bike, or do other activities. · Limit alcohol to no more than 2 drinks a day for men and 1 drink a day for women. · Do not smoke. If you need help quitting, talk to your doctor about stop-smoking programs and medicines. These can increase your chances of quitting for good. When should you call for help?   Call 911 anytime you think you may need emergency care. For example, call if:  · You have symptoms of a heart attack. These may include:  ¨ Chest pain or pressure, or a strange feeling in the chest.  ¨ Sweating. ¨ Shortness of breath. ¨ Nausea or vomiting. ¨ Pain, pressure, or a strange feeling in the back, neck, jaw, or upper belly or in one or both shoulders or arms. ¨ Lightheadedness or sudden weakness. ¨ A fast or irregular heartbeat. After you call 911, the  may tell you to chew 1 adult-strength or 2 to 4 low-dose aspirin. Wait for an ambulance. Do not try to drive yourself. · You have signs of a stroke. These may include:  ¨ Sudden numbness, paralysis, or weakness in your face, arm, or leg, especially on only one side of your body. ¨ New problems with walking or balance. ¨ Sudden vision changes. ¨ Drooling or slurred speech. ¨ New problems speaking or understanding simple statements, or feeling confused. ¨ A sudden, severe headache that is different from past headaches. · You passed out (lost consciousness). Call your doctor now or seek immediate medical care if:  · You have muscle pain or weakness. Watch closely for changes in your health, and be sure to contact your doctor if:  · You are very tired. · You have an upset stomach, gas, constipation, or belly pain or cramps. Where can you learn more? Go to GroundMetrics.be  Enter C406 in the search box to learn more about \"Hyperlipidemia: After Your Visit. \"   © 9413-0229 Healthwise, Incorporated. Care instructions adapted under license by [employer] (which disclaims liability or warranty for this information). This care instruction is for use with your licensed healthcare professional. If you have questions about a medical condition or this instruction, always ask your healthcare professional. [place] [address] any warranty or liability for your use of this information.   Content Version: 1.9.954413; Last Revised: October 13, 2011

## 2017-07-03 NOTE — PROGRESS NOTES
History of Present Illness  Zoltan Posadas is a 64 y.o. male who presents today for management of    Chief Complaint   Patient presents with    Cholesterol Problem    Diabetes    Ankle swelling       Patient takes Lantus 80 units daily. Fasting blood sugar 121  Before dinner 285, 215, 201  Patient reports of alternating diarrhea and constipation. No bloody or dark stools. He reports of having anxiety. Problem List  Patient Active Problem List    Diagnosis Date Noted    Moderate single current episode of major depressive disorder ([facility] 12/15/2016    Essential hypertension with goal blood pressure less than 140/90 09/22/2016    Microalbuminuria due to type 2 diabetes mellitus ([facility] 09/22/2016    Coronary artery disease involving coronary bypass graft of native heart without angina pectoris 07/25/2016    Type II diabetes mellitus, uncontrolled ([facility] 07/25/2016    Vitamin D deficiency 07/25/2016       Past Medical History  Past Medical History:   Diagnosis Date    Coronary artery disease     Depression     DM (diabetes mellitus screen)     Heart disease     Hyperlipidemia     Kidney calculi         Surgical History  Past Surgical History:   Procedure Laterality Date    HX OTHER SURGICAL      pt stated he had a triple bypass    HX OTHER SURGICAL      excision of right benign adrenal mass         Current Medications  Current Outpatient Prescriptions   Medication Sig    cholecalciferol (VITAMIN D3) 1,000 unit tablet Take 2 Tabs by mouth daily.  metFORMIN (GLUCOPHAGE) 1,000 mg tablet Take 1 Tab by mouth two (2) times daily (with meals).  lisinopril (PRINIVIL, ZESTRIL) 40 mg tablet Take 1 Tab by mouth daily.  FLUoxetine (PROZAC) 20 mg capsule Take 1 Cap by mouth daily.  aspirin delayed-release 81 mg tablet Take 1 Tab by mouth daily.  gemfibrozil (LOPID) 600 mg tablet Take 1 Tab by mouth two (2) times a day.     glucose blood VI test strips (BLOOD GLUCOSE TEST) strip Use as directed    Blood-Glucose Meter monitoring kit Use as directed    Lancets misc Use as directed    furosemide (LASIX) 20 mg tablet Take 1 Tab by mouth daily.  insulin glargine (LANTUS) 100 unit/mL injection 70 Units by SubCUTAneous route daily. (Patient taking differently: 80 Units by SubCUTAneous route daily.)    atorvastatin (LIPITOR) 80 mg tablet Take 1 Tab by mouth daily.  metoprolol tartrate (LOPRESSOR) 50 mg tablet Take 1 Tab by mouth two (2) times a day.  sAXagliptin (ONGLYZA) 5 mg tab tablet Take 1 Tab by mouth daily.  albuterol (PROVENTIL HFA, VENTOLIN HFA, PROAIR HFA) 90 mcg/actuation inhaler Take 2 Puffs by inhalation every six (6) hours as needed for Wheezing.  clobetasol (TEMOVATE) 0.05 % ointment Apply  to affected area two (2) times a day.  Insulin Syringes, Disposable, 1 mL syrg Use once a day. Diagnosis 250.00     No current facility-administered medications for this visit. Allergies/Drug Reactions  No Known Allergies     Family History  History reviewed. No pertinent family history. Social History  Social History     Social History    Marital status: UNKNOWN     Spouse name: N/A    Number of children: N/A    Years of education: N/A     Occupational History    Not on file.      Social History Main Topics    Smoking status: Current Every Day Smoker     Packs/day: 0.25    Smokeless tobacco: Never Used      Comment: 1 cigarette a day     Alcohol use No      Comment: Former smoker    Drug use: No    Sexual activity: Not on file     Other Topics Concern    Not on file     Social History Narrative       Review of Systems  General ROS: negative for - chills, fatigue or fever  Psychological ROS: positive for - anxiety and depression  negative for - sleep disturbances or suicidal ideation  Respiratory ROS: no cough, shortness of breath, or wheezing  Cardiovascular ROS: no chest pain or dyspnea on exertion  Gastrointestinal ROS: positive for diarrhea and constipation, no abdominal pain, change in bowel habits, or black or bloody stools  Genito-Urinary ROS: no dysuria, trouble voiding, or hematuria  Musculoskeletal ROS: negative  Neurological ROS: negative      Physical Exam  Vital signs:   Vitals:    07/03/17 1256   BP: 101/62   Pulse: 77   Resp: 18   Temp: 96.3 °F (35.7 °C)   TempSrc: Oral   SpO2: 95%   Weight: 326 lb 6.4 oz (148.1 kg)   Height: 6' 1\" (1.854 m)       General: alert, oriented, not in distress  Chest/Lungs: clear breath sounds, no wheezing or crackles  Heart: normal rate, regular rhythm, no murmur  Abdomen: soft, non-distended, non-tender, normal bowel sounds, no organomegaly, no masses  Extremities: no focal deformities, no edema    Laboratory/Tests:  Component      Latest Ref Rng & Units 6/5/2017 6/5/2017 6/5/2017 6/5/2017           1:41 PM  1:41 PM  1:41 PM  1:41 PM   Sodium      136 - 145 mmol/L    140   Potassium      3.5 - 5.5 mmol/L    4.8   Chloride      100 - 108 mmol/L    107   CO2      21 - 32 mmol/L    27   Anion gap      3.0 - 18 mmol/L    6   Glucose      74 - 99 mg/dL    234 (H)   BUN      7.0 - 18 MG/DL    25 (H)   Creatinine      0.6 - 1.3 MG/DL    1.18   BUN/Creatinine ratio      12 - 20      21 (H)   GFR est AA      >60 ml/min/1.73m2    >60   GFR est non-AA      >60 ml/min/1.73m2    >60   Calcium      8.5 - 10.1 MG/DL    9.1   Bilirubin, total      0.2 - 1.0 MG/DL    0.3   ALT (SGPT)      16 - 61 U/L    46   AST      15 - 37 U/L    31   Alk.  phosphatase      45 - 117 U/L    94   Protein, total      6.4 - 8.2 g/dL    6.2 (L)   Albumin      3.4 - 5.0 g/dL    3.3 (L)   Globulin      2.0 - 4.0 g/dL    2.9   A-G Ratio      0.8 - 1.7      1.1   Cholesterol, total      <200 MG/DL       Triglyceride      <150 MG/DL       HDL Cholesterol      40 - 60 MG/DL       LDL, calculated      0 - 100 MG/DL       VLDL, calculated      MG/DL       CHOL/HDL Ratio      0 - 5.0         Vitamin D 25-Hydroxy      30 - 100 ng/mL 19.4 (L)      HCV Ab      0.0 - 0.9 s/co ratio   <0.1 HCV Interpretation        Comment       Component      Latest Ref Rng & Units 6/5/2017           1:41 PM   Sodium      136 - 145 mmol/L    Potassium      3.5 - 5.5 mmol/L    Chloride      100 - 108 mmol/L    CO2      21 - 32 mmol/L    Anion gap      3.0 - 18 mmol/L    Glucose      74 - 99 mg/dL    BUN      7.0 - 18 MG/DL    Creatinine      0.6 - 1.3 MG/DL    BUN/Creatinine ratio      12 - 20      GFR est AA      >60 ml/min/1.73m2    GFR est non-AA      >60 ml/min/1.73m2    Calcium      8.5 - 10.1 MG/DL    Bilirubin, total      0.2 - 1.0 MG/DL    ALT (SGPT)      16 - 61 U/L    AST      15 - 37 U/L    Alk. phosphatase      45 - 117 U/L    Protein, total      6.4 - 8.2 g/dL    Albumin      3.4 - 5.0 g/dL    Globulin      2.0 - 4.0 g/dL    A-G Ratio      0.8 - 1.7      Cholesterol, total      <200 MG/   Triglyceride      <150 MG/ (H)   HDL Cholesterol      40 - 60 MG/DL 24 (L)   LDL, calculated      0 - 100 MG/DL LDL AND VLDL CHOLESTEROL NOT CALCULATED WHEN TRIGLYCERIDES >400 MG/DL OR HDL CHOLESTEROL <20 MG/DL   VLDL, calculated      MG/DL Calculation not valid with this patient's other Lipid values. CHOL/HDL Ratio      0 - 5.0   4.9   Vitamin D 25-Hydroxy      30 - 100 ng/mL    HCV Ab      0.0 - 0.9 s/co ratio    HCV Interpretation              Assessment/Plan:      1. Uncontrolled type 2 diabetes mellitus with microalbuminuria, with long-term current use of insulin (HCC)  - poorly controlled  - AMB POC GLUCOSE BLOOD, BY GLUCOSE MONITORING DEVICE  - continue metFORMIN (GLUCOPHAGE) 1,000 mg tablet; Take 1 Tab by mouth two (2) times daily (with meals). Dispense: 60 Tab; Refill: 5  - continue onglyza  - increase Lantus to 90 units daily. 2. Coronary artery disease involving coronary bypass graft of native heart without angina pectoris  - REFERRAL TO CARDIOLOGY  - aspirin delayed-release 81 mg tablet; Take 1 Tab by mouth daily. Dispense: 90 Tab; Refill: 2    3.  Hypertriglyceridemia  - poorly controlled  - cont atorvastatin  - start gemfibrozil (LOPID) 600 mg tablet; Take 1 Tab by mouth two (2) times a day. Dispense: 180 Tab; Refill: 1    4. Vitamin D deficiency  - cholecalciferol (VITAMIN D3) 1,000 unit tablet; Take 2 Tabs by mouth daily. Dispense: 100 Tab; Refill: 2    5. Essential hypertension with goal blood pressure less than 140/90  - controlled   - continue lisinopril (PRINIVIL, ZESTRIL) 40 mg tablet; Take 1 Tab by mouth daily. Dispense: 30 Tab; Refill: 2    6. Moderate single current episode of major depressive disorder (HCC)  - stable  - FLUoxetine (PROZAC) 20 mg capsule; Take 1 Cap by mouth daily. Dispense: 30 Cap; Refill: 2    7. Alternating constipation and diarrhea  - will need a colonoscopy        Follow-up Disposition:  Return in about 2 months (around 9/3/2017) for dm, htn, hld. I have discussed the diagnosis with the patient and the intended plan as seen in the above orders. The patient has received an after-visit summary and questions were answered concerning future plans. I have discussed medication side effects and warnings with the patient as well. I have reviewed the plan of care with the patient, accepted their input and they are in agreement with the treatment goals.        Dariela Miranda MD  July 3, 2017

## 2018-08-25 ENCOUNTER — APPOINTMENT (OUTPATIENT)
Dept: ULTRASOUND IMAGING | Age: 58
DRG: 335 | End: 2018-08-25
Attending: HOSPITALIST
Payer: OTHER GOVERNMENT

## 2018-08-25 ENCOUNTER — APPOINTMENT (OUTPATIENT)
Dept: CT IMAGING | Age: 58
DRG: 335 | End: 2018-08-25
Attending: EMERGENCY MEDICINE
Payer: OTHER GOVERNMENT

## 2018-08-25 ENCOUNTER — HOSPITAL ENCOUNTER (INPATIENT)
Age: 58
LOS: 4 days | Discharge: HOME HEALTH | DRG: 335 | End: 2018-08-29
Attending: EMERGENCY MEDICINE | Admitting: HOSPITALIST
Payer: OTHER GOVERNMENT

## 2018-08-25 DIAGNOSIS — K85.90 ACUTE PANCREATITIS, UNSPECIFIED COMPLICATION STATUS, UNSPECIFIED PANCREATITIS TYPE: Primary | ICD-10-CM

## 2018-08-25 LAB
ALBUMIN SERPL-MCNC: 3.4 G/DL (ref 3.4–5)
ALBUMIN/GLOB SERPL: 0.9 {RATIO} (ref 0.8–1.7)
ALP SERPL-CCNC: 160 U/L (ref 45–117)
ALT SERPL-CCNC: 667 U/L (ref 16–61)
ANION GAP SERPL CALC-SCNC: 10 MMOL/L (ref 3–18)
AST SERPL-CCNC: 820 U/L (ref 15–37)
BASOPHILS # BLD: 0 K/UL (ref 0–0.1)
BASOPHILS NFR BLD: 0 % (ref 0–2)
BILIRUB SERPL-MCNC: 1.9 MG/DL (ref 0.2–1)
BUN SERPL-MCNC: 19 MG/DL (ref 7–18)
BUN/CREAT SERPL: 15 (ref 12–20)
CALCIUM SERPL-MCNC: 8.5 MG/DL (ref 8.5–10.1)
CHLORIDE SERPL-SCNC: 99 MMOL/L (ref 100–108)
CO2 SERPL-SCNC: 28 MMOL/L (ref 21–32)
CREAT SERPL-MCNC: 1.28 MG/DL (ref 0.6–1.3)
DIFFERENTIAL METHOD BLD: ABNORMAL
EOSINOPHIL # BLD: 0 K/UL (ref 0–0.4)
EOSINOPHIL NFR BLD: 1 % (ref 0–5)
ERYTHROCYTE [DISTWIDTH] IN BLOOD BY AUTOMATED COUNT: 14.7 % (ref 11.6–14.5)
EST. AVERAGE GLUCOSE BLD GHB EST-MCNC: 180 MG/DL
GLOBULIN SER CALC-MCNC: 3.8 G/DL (ref 2–4)
GLUCOSE BLD STRIP.AUTO-MCNC: 266 MG/DL (ref 70–110)
GLUCOSE SERPL-MCNC: 320 MG/DL (ref 74–99)
HBA1C MFR BLD: 7.9 % (ref 4.2–5.6)
HCT VFR BLD AUTO: 42 % (ref 36–48)
HGB BLD-MCNC: 13.9 G/DL (ref 13–16)
LIPASE SERPL-CCNC: 4383 U/L (ref 73–393)
LYMPHOCYTES # BLD: 0.6 K/UL (ref 0.9–3.6)
LYMPHOCYTES NFR BLD: 9 % (ref 21–52)
MCH RBC QN AUTO: 29.8 PG (ref 24–34)
MCHC RBC AUTO-ENTMCNC: 33.1 G/DL (ref 31–37)
MCV RBC AUTO: 90.1 FL (ref 74–97)
MONOCYTES # BLD: 0.4 K/UL (ref 0.05–1.2)
MONOCYTES NFR BLD: 6 % (ref 3–10)
NEUTS SEG # BLD: 5.4 K/UL (ref 1.8–8)
NEUTS SEG NFR BLD: 84 % (ref 40–73)
PLATELET # BLD AUTO: 218 K/UL (ref 135–420)
PMV BLD AUTO: 8.4 FL (ref 9.2–11.8)
POTASSIUM SERPL-SCNC: 4.2 MMOL/L (ref 3.5–5.5)
PROT SERPL-MCNC: 7.2 G/DL (ref 6.4–8.2)
RBC # BLD AUTO: 4.66 M/UL (ref 4.7–5.5)
SODIUM SERPL-SCNC: 137 MMOL/L (ref 136–145)
TROPONIN I SERPL-MCNC: <0.02 NG/ML (ref 0–0.04)
WBC # BLD AUTO: 6.3 K/UL (ref 4.6–13.2)

## 2018-08-25 PROCEDURE — 74011250637 HC RX REV CODE- 250/637: Performed by: HOSPITALIST

## 2018-08-25 PROCEDURE — 99284 EMERGENCY DEPT VISIT MOD MDM: CPT

## 2018-08-25 PROCEDURE — 96374 THER/PROPH/DIAG INJ IV PUSH: CPT

## 2018-08-25 PROCEDURE — 96375 TX/PRO/DX INJ NEW DRUG ADDON: CPT

## 2018-08-25 PROCEDURE — 99285 EMERGENCY DEPT VISIT HI MDM: CPT

## 2018-08-25 PROCEDURE — 74011636637 HC RX REV CODE- 636/637: Performed by: HOSPITALIST

## 2018-08-25 PROCEDURE — 65270000029 HC RM PRIVATE

## 2018-08-25 PROCEDURE — 85025 COMPLETE CBC W/AUTO DIFF WBC: CPT | Performed by: EMERGENCY MEDICINE

## 2018-08-25 PROCEDURE — 84484 ASSAY OF TROPONIN QUANT: CPT | Performed by: EMERGENCY MEDICINE

## 2018-08-25 PROCEDURE — 74011250636 HC RX REV CODE- 250/636: Performed by: HOSPITALIST

## 2018-08-25 PROCEDURE — 80053 COMPREHEN METABOLIC PANEL: CPT | Performed by: EMERGENCY MEDICINE

## 2018-08-25 PROCEDURE — 83690 ASSAY OF LIPASE: CPT | Performed by: EMERGENCY MEDICINE

## 2018-08-25 PROCEDURE — 96361 HYDRATE IV INFUSION ADD-ON: CPT

## 2018-08-25 PROCEDURE — 82962 GLUCOSE BLOOD TEST: CPT

## 2018-08-25 PROCEDURE — 76705 ECHO EXAM OF ABDOMEN: CPT

## 2018-08-25 PROCEDURE — 74176 CT ABD & PELVIS W/O CONTRAST: CPT

## 2018-08-25 PROCEDURE — 74011250636 HC RX REV CODE- 250/636: Performed by: EMERGENCY MEDICINE

## 2018-08-25 PROCEDURE — 93005 ELECTROCARDIOGRAM TRACING: CPT

## 2018-08-25 PROCEDURE — 83036 HEMOGLOBIN GLYCOSYLATED A1C: CPT | Performed by: HOSPITALIST

## 2018-08-25 RX ORDER — MAGNESIUM SULFATE 100 %
4 CRYSTALS MISCELLANEOUS AS NEEDED
Status: DISCONTINUED | OUTPATIENT
Start: 2018-08-25 | End: 2018-08-29 | Stop reason: HOSPADM

## 2018-08-25 RX ORDER — ONDANSETRON 2 MG/ML
4 INJECTION INTRAMUSCULAR; INTRAVENOUS
Status: COMPLETED | OUTPATIENT
Start: 2018-08-25 | End: 2018-08-25

## 2018-08-25 RX ORDER — METOPROLOL TARTRATE 50 MG/1
50 TABLET ORAL 2 TIMES DAILY
Status: DISCONTINUED | OUTPATIENT
Start: 2018-08-25 | End: 2018-08-29 | Stop reason: HOSPADM

## 2018-08-25 RX ORDER — DEXTROSE 50 % IN WATER (D50W) INTRAVENOUS SYRINGE
25-50 AS NEEDED
Status: DISCONTINUED | OUTPATIENT
Start: 2018-08-25 | End: 2018-08-29 | Stop reason: HOSPADM

## 2018-08-25 RX ORDER — METOPROLOL TARTRATE 50 MG/1
50 TABLET ORAL ONCE
Status: COMPLETED | OUTPATIENT
Start: 2018-08-25 | End: 2018-08-25

## 2018-08-25 RX ORDER — DEXTROSE, SODIUM CHLORIDE, AND POTASSIUM CHLORIDE 5; .45; .15 G/100ML; G/100ML; G/100ML
100 INJECTION INTRAVENOUS CONTINUOUS
Status: DISCONTINUED | OUTPATIENT
Start: 2018-08-25 | End: 2018-08-29 | Stop reason: HOSPADM

## 2018-08-25 RX ORDER — INSULIN LISPRO 100 [IU]/ML
INJECTION, SOLUTION INTRAVENOUS; SUBCUTANEOUS
Status: DISCONTINUED | OUTPATIENT
Start: 2018-08-25 | End: 2018-08-26

## 2018-08-25 RX ORDER — MORPHINE SULFATE 4 MG/ML
4 INJECTION INTRAVENOUS
Status: COMPLETED | OUTPATIENT
Start: 2018-08-25 | End: 2018-08-25

## 2018-08-25 RX ORDER — INSULIN GLARGINE 100 [IU]/ML
15 INJECTION, SOLUTION SUBCUTANEOUS
Status: DISCONTINUED | OUTPATIENT
Start: 2018-08-25 | End: 2018-08-26

## 2018-08-25 RX ORDER — SODIUM CHLORIDE 9 MG/ML
150 INJECTION, SOLUTION INTRAVENOUS ONCE
Status: COMPLETED | OUTPATIENT
Start: 2018-08-25 | End: 2018-08-25

## 2018-08-25 RX ORDER — FLUOXETINE HYDROCHLORIDE 20 MG/1
20 CAPSULE ORAL DAILY
Status: DISCONTINUED | OUTPATIENT
Start: 2018-08-26 | End: 2018-08-29 | Stop reason: HOSPADM

## 2018-08-25 RX ORDER — LISINOPRIL 40 MG/1
40 TABLET ORAL DAILY
Status: DISCONTINUED | OUTPATIENT
Start: 2018-08-26 | End: 2018-08-29 | Stop reason: HOSPADM

## 2018-08-25 RX ADMIN — DEXTROSE MONOHYDRATE, SODIUM CHLORIDE, AND POTASSIUM CHLORIDE 100 ML/HR: 50; 4.5; 1.49 INJECTION, SOLUTION INTRAVENOUS at 18:27

## 2018-08-25 RX ADMIN — MORPHINE SULFATE 4 MG: 4 INJECTION INTRAVENOUS at 16:36

## 2018-08-25 RX ADMIN — SODIUM CHLORIDE 1000 ML: 900 INJECTION, SOLUTION INTRAVENOUS at 17:37

## 2018-08-25 RX ADMIN — SODIUM CHLORIDE 1000 ML: 900 INJECTION, SOLUTION INTRAVENOUS at 15:39

## 2018-08-25 RX ADMIN — METOPROLOL TARTRATE 50 MG: 50 TABLET ORAL at 23:41

## 2018-08-25 RX ADMIN — INSULIN GLARGINE 15 UNITS: 100 INJECTION, SOLUTION SUBCUTANEOUS at 23:41

## 2018-08-25 RX ADMIN — ONDANSETRON 4 MG: 2 INJECTION, SOLUTION INTRAMUSCULAR; INTRAVENOUS at 16:36

## 2018-08-25 RX ADMIN — METOPROLOL TARTRATE 50 MG: 50 TABLET ORAL at 18:28

## 2018-08-25 RX ADMIN — SODIUM CHLORIDE 150 ML/HR: 900 INJECTION, SOLUTION INTRAVENOUS at 17:37

## 2018-08-25 RX ADMIN — INSULIN LISPRO 9 UNITS: 100 INJECTION, SOLUTION INTRAVENOUS; SUBCUTANEOUS at 23:40

## 2018-08-25 NOTE — ED PROVIDER NOTES
EMERGENCY DEPARTMENT HISTORY AND PHYSICAL EXAM    3:42 PM      Date: 8/25/2018  Patient Name: Laura Nathan    History of Presenting Illness     Chief Complaint   Patient presents with    Abdominal Pain         History Provided By: Patient    Chief Complaint: Abdominal pain  Duration:  2 days  Timing:  Acute, intermittent and worsening  Location: Epigastric  Severity: severe  Associated Symptoms: back pain, hiccups, vomiting      Additional History (Context): 3:48 PM Laura Nathan is a 62 y.o. male with h/o DM, CAD, hyperlipidemia and Sx of triple bypass and excision of right benign adrenal mass who presents to ED complaining of acute, intermittent and worsening epigastric pain, 10/10 severity, with associated symptoms of back pain, hiccups and vomiting onset 2 days ago. No other concerns or symptoms at this time. PCP: Asa Hannah MD        Current Facility-Administered Medications   Medication Dose Route Frequency Provider Last Rate Last Dose    sodium chloride 0.9 % bolus infusion 1,000 mL  1,000 mL IntraVENous ONCE Lexx Alston MD        0.9% sodium chloride infusion  150 mL/hr IntraVENous ONCE Lexx Alston MD         Current Outpatient Prescriptions   Medication Sig Dispense Refill    cholecalciferol (VITAMIN D3) 1,000 unit tablet Take 2 Tabs by mouth daily. 100 Tab 2    metFORMIN (GLUCOPHAGE) 1,000 mg tablet Take 1 Tab by mouth two (2) times daily (with meals). 60 Tab 5    lisinopril (PRINIVIL, ZESTRIL) 40 mg tablet Take 1 Tab by mouth daily. 30 Tab 2    FLUoxetine (PROZAC) 20 mg capsule Take 1 Cap by mouth daily. 30 Cap 2    aspirin delayed-release 81 mg tablet Take 1 Tab by mouth daily. 90 Tab 2    gemfibrozil (LOPID) 600 mg tablet Take 1 Tab by mouth two (2) times a day.  180 Tab 1    glucose blood VI test strips (BLOOD GLUCOSE TEST) strip Use as directed 200 Strip 5    Blood-Glucose Meter monitoring kit Use as directed 1 Kit 0    Lancets misc Use as directed 200 Each 5    furosemide (LASIX) 20 mg tablet Take 1 Tab by mouth daily. 30 Tab 2    insulin glargine (LANTUS) 100 unit/mL injection 70 Units by SubCUTAneous route daily. (Patient taking differently: 90 Units by SubCUTAneous route daily.) 30 mL 5    atorvastatin (LIPITOR) 80 mg tablet Take 1 Tab by mouth daily. 60 Tab 3    metoprolol tartrate (LOPRESSOR) 50 mg tablet Take 1 Tab by mouth two (2) times a day. 60 Tab 3    sAXagliptin (ONGLYZA) 5 mg tab tablet Take 1 Tab by mouth daily. 30 Tab 3    albuterol (PROVENTIL HFA, VENTOLIN HFA, PROAIR HFA) 90 mcg/actuation inhaler Take 2 Puffs by inhalation every six (6) hours as needed for Wheezing. 1 Inhaler 3    clobetasol (TEMOVATE) 0.05 % ointment Apply  to affected area two (2) times a day. 60 g 2    Insulin Syringes, Disposable, 1 mL syrg Use once a day. Diagnosis 250.00 100 Syringe 5       Past History     Past Medical History:  Past Medical History:   Diagnosis Date    Coronary artery disease     Depression     DM (diabetes mellitus screen)     Heart disease     Hyperlipidemia     Kidney calculi        Past Surgical History:  Past Surgical History:   Procedure Laterality Date    HX OTHER SURGICAL      pt stated he had a triple bypass    HX OTHER SURGICAL      excision of right benign adrenal mass        Family History:  History reviewed. No pertinent family history. Social History:  Social History   Substance Use Topics    Smoking status: Current Every Day Smoker     Packs/day: 0.25    Smokeless tobacco: Never Used      Comment: 1 cigarette a day     Alcohol use No      Comment: Former smoker       Allergies:  No Known Allergies      Review of Systems     Review of Systems   Constitutional: Negative for diaphoresis and fever. HENT: Negative for congestion and sore throat. Eyes: Negative for pain and itching. Respiratory: Negative for cough and shortness of breath. Cardiovascular: Negative for chest pain and palpitations.    Gastrointestinal: Positive for abdominal pain and vomiting. Negative for diarrhea. Endocrine: Negative for polydipsia and polyuria. Genitourinary: Negative for dysuria and hematuria. Musculoskeletal: Positive for back pain. Negative for arthralgias and myalgias. Skin: Negative for rash and wound. Neurological: Negative for seizures and syncope. Hematological: Does not bruise/bleed easily. Psychiatric/Behavioral: Negative for agitation and hallucinations. Physical Exam     Visit Vitals    /85 (BP 1 Location: Left arm, BP Patient Position: At rest;Sitting)    Pulse 96    Temp 98.3 °F (36.8 °C)    Resp 20    Ht 6' 1\" (1.854 m)    Wt 145.6 kg (321 lb)    SpO2 97%    BMI 42.35 kg/m2       Physical Exam   Constitutional: He appears well-developed and well-nourished. He appears distressed. Appears uncomfortable   HENT:   Head: Normocephalic and atraumatic. Eyes: Conjunctivae are normal. No scleral icterus. Neck: Normal range of motion. Neck supple. No JVD present. Cardiovascular: Normal rate, regular rhythm and normal heart sounds. 4 intact extremity pulses   Pulmonary/Chest: Effort normal and breath sounds normal.   Abdominal: Soft. He exhibits no mass. There is tenderness in the epigastric area. Musculoskeletal: Normal range of motion. Lymphadenopathy:     He has no cervical adenopathy. Neurological: He is alert. Skin: Skin is warm and dry. Nursing note and vitals reviewed.         Diagnostic Study Results   Labs -  Recent Results (from the past 12 hour(s))   CBC WITH AUTOMATED DIFF    Collection Time: 08/25/18  3:40 PM   Result Value Ref Range    WBC 6.3 4.6 - 13.2 K/uL    RBC 4.66 (L) 4.70 - 5.50 M/uL    HGB 13.9 13.0 - 16.0 g/dL    HCT 42.0 36.0 - 48.0 %    MCV 90.1 74.0 - 97.0 FL    MCH 29.8 24.0 - 34.0 PG    MCHC 33.1 31.0 - 37.0 g/dL    RDW 14.7 (H) 11.6 - 14.5 %    PLATELET 303 131 - 686 K/uL    MPV 8.4 (L) 9.2 - 11.8 FL    NEUTROPHILS 84 (H) 40 - 73 %    LYMPHOCYTES 9 (L) 21 - 52 % MONOCYTES 6 3 - 10 %    EOSINOPHILS 1 0 - 5 %    BASOPHILS 0 0 - 2 %    ABS. NEUTROPHILS 5.4 1.8 - 8.0 K/UL    ABS. LYMPHOCYTES 0.6 (L) 0.9 - 3.6 K/UL    ABS. MONOCYTES 0.4 0.05 - 1.2 K/UL    ABS. EOSINOPHILS 0.0 0.0 - 0.4 K/UL    ABS. BASOPHILS 0.0 0.0 - 0.1 K/UL    DF AUTOMATED     METABOLIC PANEL, COMPREHENSIVE    Collection Time: 08/25/18  3:40 PM   Result Value Ref Range    Sodium 137 136 - 145 mmol/L    Potassium 4.2 3.5 - 5.5 mmol/L    Chloride 99 (L) 100 - 108 mmol/L    CO2 28 21 - 32 mmol/L    Anion gap 10 3.0 - 18 mmol/L    Glucose 320 (H) 74 - 99 mg/dL    BUN 19 (H) 7.0 - 18 MG/DL    Creatinine 1.28 0.6 - 1.3 MG/DL    BUN/Creatinine ratio 15 12 - 20      GFR est AA >60 >60 ml/min/1.73m2    GFR est non-AA 58 (L) >60 ml/min/1.73m2    Calcium 8.5 8.5 - 10.1 MG/DL    Bilirubin, total 1.9 (H) 0.2 - 1.0 MG/DL    ALT (SGPT) 667 (H) 16 - 61 U/L    AST (SGOT) 820 (H) 15 - 37 U/L    Alk.  phosphatase 160 (H) 45 - 117 U/L    Protein, total 7.2 6.4 - 8.2 g/dL    Albumin 3.4 3.4 - 5.0 g/dL    Globulin 3.8 2.0 - 4.0 g/dL    A-G Ratio 0.9 0.8 - 1.7     LIPASE    Collection Time: 08/25/18  3:40 PM   Result Value Ref Range    Lipase 4383 (H) 73 - 393 U/L   TROPONIN I    Collection Time: 08/25/18  3:40 PM   Result Value Ref Range    Troponin-I, Qt. <0.02 0.0 - 0.045 NG/ML   EKG, 12 LEAD, INITIAL    Collection Time: 08/25/18  4:32 PM   Result Value Ref Range    Ventricular Rate 84 BPM    Atrial Rate 84 BPM    P-R Interval 188 ms    QRS Duration 120 ms    Q-T Interval 418 ms    QTC Calculation (Bezet) 493 ms    Calculated P Axis 42 degrees    Calculated R Axis 38 degrees    Calculated T Axis 47 degrees    Diagnosis       Normal sinus rhythm  Nonspecific intraventricular conduction delay  Nonspecific T wave abnormality  Abnormal ECG  No previous ECGs available         Radiologic Studies -   CT ABD PELV WO CONT   Final Result      US ABD LTD    (Results Pending)     Ct Abd Pelv Wo Cont    Result Date: 8/25/2018  Exam:  CT of the chest, abdomen and pelvis Indication:  Severe abdominal pain Comparison:  none Technique:  A CT scan of the abdomen and pelvis was performed. Axial images were obtained from the base of the chest thru the pubic symphysis. The study was performed without the administration of either oral or intravenous contrast material.  Multiplanar reconstructions were performed. Dose reduction techniques used: Automated exposure control, adjustment of the mAs and/or kVp according to patient's size, and/or iterative reconstruction techniques. Techniques utilized are listed in the medical record. Findings: Lung base:  Scattered atelectasis Liver: Normal in size and attenuation showing no masses to suggest malignancy, either primary or metastatic. Biliary System: Normal in appearance. Spleen: Normal in size and attenuation. Pancreas: There is stranding of the fat adjacent to the pancreas and extending to the perisplenic region. Stranding of the fat is also noted inferiorly along the retroperitoneum anterior to the aorta and IVC. The overall appearance is consistent with acute pancreatitis. Adrenal glands: Surgical clips are evident in the right adrenal region. No adrenal mass is demonstrated. Kidneys: Normal in appearance bilaterally, showing no evidence of malignancy or obstruction. Vascular Structures: Normal in caliber. Lymph nodes: Normal. No adenopathy. GI tract: The appendix is normal. Scattered colonic diverticula are evident. Pelvic Organs: Normal in appearance. Osseous structures: Spondylosis.   Lumbar spinal stenosis is present Other:  None     IMPRESSION: Fat stranding in the retroperitoneum is present compatible with acute pancreatitis Colonic diverticulosis       Medications ordered:   Medications   sodium chloride 0.9 % bolus infusion 1,000 mL (not administered)   0.9% sodium chloride infusion (not administered)   morphine injection 4 mg (4 mg IntraVENous Given 8/25/18 1636)   ondansetron (ZOFRAN) injection 4 mg (4 mg IntraVENous Given 8/25/18 2196)   sodium chloride 0.9 % bolus infusion 1,000 mL (1,000 mL IntraVENous New Bag 8/25/18 1539)         Medical Decision Making   Initial Medical Decision Making and DDx:  DDx: ACS, pancreatitis, gastritis, peptic ulcer disease. Doubt cholecystitis, bowel obstruction, perforation. Unlikely to be aortic dissection. ED Course: Progress Notes, Reevaluation, and Consults:  Consult:  Discussed care with Dr. Kathy Camarena, Hospitalist. Standard discussion; including history of patients chief complaint, available diagnostic results, and treatment course. Noted elevated liver enzyme, possible choledocholithiasis. No doubt on acute cholecystitis. Will admit.  5:11 PM, 8/25/2018     5:14 PM reassessed, has some epigastric and left upper quad tenderness. No dez's sign. Sleeping on my arrival.     I am the first provider for this patient. I reviewed the vital signs, available nursing notes, past medical history, past surgical history, family history and social history. Vital Signs-Reviewed the patient's vital signs. Cardiac Monitor:  Rate/Rhythm:  97% room air, wnl    EKG: Interpreted by the EP. Time Interpreted: 6280   Rate: 84    Rhythm: Normal sinus rhythm    Interpretation: No acute findings    Records Reviewed: Nursing Notes and Old Medical Records (Time of Review: 3:42 PM)    For Hospitalized Patients:    2. Aspirin: Aspirin was not given because the patient did not present with a stroke at the time of their Emergency Department evaluation    Diagnosis     Clinical Impression:   1. Acute pancreatitis, unspecified complication status, unspecified pancreatitis type    2.  Uncontrolled type 2 diabetes mellitus with microalbuminuria, with long-term current use of insulin ([facility])        Disposition: Admit    Follow-up Information     None           Patient's Medications   Start Taking    No medications on file   Continue Taking    ALBUTEROL (PROVENTIL HFA, VENTOLIN HFA, PROAIR HFA) 90 MCG/ACTUATION INHALER    Take 2 Puffs by inhalation every six (6) hours as needed for Wheezing. ASPIRIN DELAYED-RELEASE 81 MG TABLET    Take 1 Tab by mouth daily. ATORVASTATIN (LIPITOR) 80 MG TABLET    Take 1 Tab by mouth daily. BLOOD-GLUCOSE METER MONITORING KIT    Use as directed    CHOLECALCIFEROL (VITAMIN D3) 1,000 UNIT TABLET    Take 2 Tabs by mouth daily. CLOBETASOL (TEMOVATE) 0.05 % OINTMENT    Apply  to affected area two (2) times a day. FLUOXETINE (PROZAC) 20 MG CAPSULE    Take 1 Cap by mouth daily. FUROSEMIDE (LASIX) 20 MG TABLET    Take 1 Tab by mouth daily. GEMFIBROZIL (LOPID) 600 MG TABLET    Take 1 Tab by mouth two (2) times a day. GLUCOSE BLOOD VI TEST STRIPS (BLOOD GLUCOSE TEST) STRIP    Use as directed    INSULIN GLARGINE (LANTUS) 100 UNIT/ML INJECTION    70 Units by SubCUTAneous route daily. INSULIN SYRINGES, DISPOSABLE, 1 ML SYRG    Use once a day. Diagnosis 250.00    LANCETS MISC    Use as directed    LISINOPRIL (PRINIVIL, ZESTRIL) 40 MG TABLET    Take 1 Tab by mouth daily. METFORMIN (GLUCOPHAGE) 1,000 MG TABLET    Take 1 Tab by mouth two (2) times daily (with meals). METOPROLOL TARTRATE (LOPRESSOR) 50 MG TABLET    Take 1 Tab by mouth two (2) times a day. SAXAGLIPTIN (ONGLYZA) 5 MG TAB TABLET    Take 1 Tab by mouth daily. These Medications have changed    No medications on file   Stop Taking    No medications on file     _______________________________    Attestations:  PhilippUche Danette Ned acting as a scribe for and in the presence of Dorothy Rucker MD      August 25, 2018 at 3:42 PM       Provider Attestation:      I personally performed the services described in the documentation, reviewed the documentation, as recorded by the scribe in my presence, and it accurately and completely records my words and actions.  August 25, 2018 at 3:42 PM - Dorothy Rucker MD    _______________________________

## 2018-08-25 NOTE — IP AVS SNAPSHOT
Jenny [address] Patient: Duc Jimenez MRN: [id number] VNX:1/02/2026 About your hospitalization You were admitted on:  August 25, 2018 You last received care in the:  [address] [place] [address],[address] You were discharged on:  August 29, 2018 Why you were hospitalized Your primary diagnosis was:  Calculus Of Gallbladder Without Cholecystitis Your diagnoses also included:  Pancreatitis, Type Ii Diabetes Mellitus, Uncontrolled (Hcc), Essential Hypertension With Goal Blood Pressure Less Than 140/90, Coronary Artery Disease Involving Coronary Bypass Graft Of Native Heart Without Angina Pectoris, Morbid Obesity (Hcc) Follow-up Information Follow up With Details Comments Contact Info Asa Hannah, MD   Patient can only remember the practice name and not the physician Discharge Orders None A check agnieszka indicates which time of day the medication should be taken. My Medications CHANGE how you take these medications Instructions Each Dose to Equal  
 Morning Noon Evening Bedtime  
 insulin glargine 100 unit/mL injection Commonly known as:  LANTUS What changed:  how much to take Your last dose was: Your next dose is:    
   
   
 70 Units by SubCUTAneous route daily. 70 Units CONTINUE taking these medications Instructions Each Dose to Equal  
 Morning Noon Evening Bedtime  
 albuterol 90 mcg/actuation inhaler Commonly known as:  PROVENTIL HFA, VENTOLIN HFA, PROAIR HFA Your last dose was: Your next dose is: Take 2 Puffs by inhalation every six (6) hours as needed for Wheezing. 2 Puff  
    
   
   
   
  
 aspirin delayed-release 81 mg tablet Your last dose was: Your next dose is: Take 1 Tab by mouth daily. 81 mg Blood-Glucose Meter monitoring kit Your last dose was: Your next dose is:    
   
   
 Use as directed  
     
   
   
   
  
 cholecalciferol 1,000 unit tablet Commonly known as:  VITAMIN D3 Your last dose was: Your next dose is: Take 2 Tabs by mouth daily. 2000 Units  
    
   
   
   
  
 clobetasol 0.05 % ointment Commonly known as:  Watson Renteria Your last dose was: Your next dose is:    
   
   
 Apply  to affected area two (2) times a day. FLUoxetine 20 mg capsule Commonly known as:  PROzac Your last dose was: Your next dose is: Take 1 Cap by mouth daily. 20 mg  
    
   
   
   
  
 furosemide 20 mg tablet Commonly known as:  LASIX Your last dose was: Your next dose is: Take 1 Tab by mouth daily. 20 mg  
    
   
   
   
  
 gemfibrozil 600 mg tablet Commonly known as:  LOPID Your last dose was: Your next dose is: Take 1 Tab by mouth two (2) times a day. 600 mg  
    
   
   
   
  
 glucose blood VI test strips strip Commonly known as:  blood glucose test  
   
Your last dose was: Your next dose is:    
   
   
 Use as directed Insulin Syringes (Disposable) 1 mL Syrg Your last dose was: Your next dose is:    
   
   
 Use once a day. Diagnosis 250.00 Lancets Misc Your last dose was: Your next dose is:    
   
   
 Use as directed  
     
   
   
   
  
 lisinopril 40 mg tablet Commonly known as:  Sariah Mirza Your last dose was: Your next dose is: Take 1 Tab by mouth daily. 40 mg  
    
   
   
   
  
 metFORMIN 1,000 mg tablet Commonly known as:  GLUCOPHAGE Your last dose was: Your next dose is: Take 1 Tab by mouth two (2) times daily (with meals). 1000 mg  
    
   
   
   
  
 metoprolol tartrate 50 mg tablet Commonly known as:  LOPRESSOR Your last dose was: Your next dose is: Take 1 Tab by mouth two (2) times a day. 50 mg Discharge Instructions DISCHARGE SUMMARY from Nurse PATIENT INSTRUCTIONS: 
 
 
F-face looks uneven A-arms unable to move or move unevenly S-speech slurred or non-existent T-time-call 911 as soon as signs and symptoms begin-DO NOT go Back to bed or wait to see if you get better-TIME IS BRAIN. Warning Signs of HEART ATTACK Call 911 if you have these symptoms: 
? Chest discomfort. Most heart attacks involve discomfort in the center of the chest that lasts more than a few minutes, or that goes away and comes back. It can feel like uncomfortable pressure, squeezing, fullness, or pain. ? Discomfort in other areas of the upper body. Symptoms can include pain or discomfort in one or both arms, the back, neck, jaw, or stomach. ? Shortness of breath with or without chest discomfort. ? Other signs may include breaking out in a cold sweat, nausea, or lightheadedness. Don't wait more than five minutes to call [address]! Fast action can save your life. Calling 911 is almost always the fastest way to get lifesaving treatment. Emergency Medical Services staff can begin treatment when they arrive  up to an hour sooner than if someone gets to the hospital by car. The discharge information has been reviewed with the patient. The patient verbalized understanding. Discharge medications reviewed with the patient and appropriate educational materials and side effects teaching were provided. Patient armband removed and shredded. ___________________________________________________________________________________________________________________________________ [employer] Announcement We are excited to announce that we are making your provider's discharge notes available to you in Appknox. You will see these notes when they are completed and signed by the physician that discharged you from your recent hospital stay. If you have any questions or concerns about any information you see in Appknox, please call the Health Information Department where you were seen or reach out to your Primary Care Provider for more information about your plan of care. Introducing [facility]! Dear Niko Moran: 
Thank you for requesting a Appknox account. Our records indicate that you already have an active Appknox account. You can access your account anytime at [email or web address]. Boost Your Campaign/[employer] Did you know that you can access your hospital and ER discharge instructions at any time in Appknox? You can also review all of your test results from your hospital stay or ER visit. Additional Information If you have questions, please visit the Frequently Asked Questions section of the Appknox website at [email or web address]. Boost Your Campaign/[employer]/. Remember, Appknox is NOT to be used for urgent needs. For medical emergencies, dial 911. Now available from your iPhone and Android! Introducing Roberto Sanchez As a [employer] patient, I wanted to make you aware of our electronic visit tool called Roberto Sanchez. [employer] 24/7 allows you to connect within minutes with a medical provider 24 hours a day, seven days a week via a mobile device or tablet or logging into a secure website from your computer. You can access Roberto Sanchez from anywhere in the [place].  
 
A virtual visit might be right for you when you have a simple condition and feel like you just dont want to get out of bed, or cant get away from work for an appointment, when your regular [employer] provider is not available (evenings, weekends or holidays), or when youre out of town and need minor care. Electronic visits cost only $49 and if the [employer] 24/7 provider determines a prescription is needed to treat your condition, one can be electronically transmitted to a nearby pharmacy*. Please take a moment to enroll today if you have not already done so. The enrollment process is free and takes just a few minutes. To enroll, please download the [employer] 24/7 judson to your tablet or phone, or visit [email or web address] ([email or web address] to enroll on your computer. And, as an [address] [place] [address] patient with a [employer] account, the results of your visits will be scanned into your electronic medical record and your primary care provider will be able to view the scanned results. We urge you to continue to see your regular [employer] provider for your ongoing medical care. And while your primary care provider may not be the one available when you seek a [email or web address] virtual visit, the peace of mind you get from getting a real diagnosis real time can be priceless. For more information on [email or web address], view our Frequently Asked Questions (FAQs) at [email or web address] ([email or web address]. Sincerely, 
 
Slick Parisi MD 
Chief Medical Officer [employer] *:  certain medications cannot be prescribed via [email or web address] Providers Seen During Your Hospitalization Provider Specialty Primary office phone Neftali Grubbs MD Emergency Medicine [phone] Debbie Mulligan MD Family Practice [phone] Shazia Bertrand DO Internal Medicine [phone] Your Primary Care Physician (PCP) Primary Care Physician Office Phone Office Fax OTHER, PHYS ** None ** ** None ** You are allergic to the following No active allergies Recent Documentation Height Weight BMI Smoking Status 1.854 m 145.6 kg 42.35 kg/m2 Current Every Day Smoker Emergency Contacts Name Discharge Info Relation Home Work Mobile Kia Méndez DISCHARGE CAREGIVER [3] Other Relative [6] [phone] Patient Belongings The following personal items are in your possession at time of discharge: 
  Dental Appliances: None  Visual Aid: Glasses      Home Medications: None   Jewelry: None  Clothing: At bedside, Pants, Footwear, Shirt, With patient    Other Valuables: At bedside, Cell Phone Discharge Instructions Attachments/References PANCREATITIS: ACUTE: GENERAL INFO (ENGLISH) GALLBLADDER DISEASE: LOW-FAT DIET (ENGLISH) Patient Handouts Learning About Acute Pancreatitis What is acute pancreatitis? The pancreas is an organ behind the stomach. It makes hormones like insulin that help control how your body uses sugar. It also makes enzymes that help you digest food. Usually these enzymes flow from the pancreas to the intestines. But if they leak into the pancreas, they can irritate it and cause pain and swelling. When this happens suddenly, it's called acute pancreatitis. What causes it? Most of the time, acute pancreatitis is caused by gallstones or by heavy alcohol use. But several other things can cause it, such as: 
· High levels of fats in the blood. · An injury. · A problem after a surgery or a procedure. · Certain medicines. What are the symptoms? The main symptom is pain in the upper belly. The pain can be severe. You also may have a fever, nausea, or vomiting. Some people get so sick that they have problems breathing. They also may have low blood pressure. How is it diagnosed? Your doctor will diagnose pancreatitis with an exam and by looking at your lab tests. Your doctor may think that you have this problem based on your symptoms and where you have pain in your belly. You may have blood tests of enzymes called amylase and lipase.  In pancreatitis, the level of these enzymes is usually much higher than normal. 
You also may have imaging tests of the belly. These may include an ultrasound, a CT scan, or an MRI. A special MRI called MRCP can show images of the bile ducts. This test can be very helpful when gallstones are causing the problem. How is it treated? Treatment of acute pancreatitis usually takes place in the hospital. It focuses on taking care of pain and supporting your body while your pancreas heals. In severe cases, treatment may occur in an intensive care unit to support breathing, treat serious infections, or help raise very low blood pressure. If a gallstone is causing the problem, the gallstone may need to be removed. This is done during a procedure called ERCP. The doctor puts a scope in your mouth and moves it gently through the stomach and into the ducts from the pancreas and gallbladder. The doctor is then able to see a stone and remove it. Sometimes the gallbladder, which makes gallstones, needs to be removed by surgery. People with pancreatitis often need a lot of fluid to help support their other organs and their blood pressure. They get fluids through a vein (intravenous, or IV). Instead of food by mouth, nutrition is sometimes given through a vein while the pancreas heals. Follow-up care is a key part of your treatment and safety. Be sure to make and go to all appointments, and call your doctor if you are having problems. It's also a good idea to know your test results and keep a list of the medicines you take. Where can you learn more? Go to http://jamar-marcello.info/. Enter G760 in the search box to learn more about \"Learning About Acute Pancreatitis. \" Current as of: May 12, 2017 Content Version: 11.7 © 5466-3568 [employer].  Care instructions adapted under license by [employer] (which disclaims liability or warranty for this information). If you have questions about a medical condition or this instruction, always ask your healthcare professional. [address] any warranty or liability for your use of this information. Low-Fat Diet for Gallbladder Disease: Care Instructions Your Care Instructions When you eat, the gallbladder releases bile, which helps you digest the fat in food. If you have an inflamed gallbladder, this may cause pain. A low-fat diet may give your gallbladder a rest so you can start to heal. Your doctor and dietitian can help you make an eating plan that does not irritate your digestive system. Always talk with your doctor or dietitian before you make changes in your diet. Follow-up care is a key part of your treatment and safety. Be sure to make and go to all appointments, and call your doctor if you are having problems. It's also a good idea to know your test results and keep a list of the medicines you take. How can you care for yourself at home? · Eat many small meals and snacks each day instead of three large meals. · Choose lean meats. ¨ Eat no more than 5 to 6½ ounces of meat a day. ¨ Cut off all fat you can see. ¨ Eat chicken and turkey without the skin. ¨ Many types of fish, such as salmon, lake trout, tuna, and herring, provide healthy omega-3 fat. But, avoid fish canned in oil, such as sardines in olive oil. ¨ Bake, broil, or grill meats, poultry, or fish instead of frying them in butter or fat. · Drink or eat nonfat or low-fat milk, yogurt, cheese, or other milk products each day. ¨ Read the labels on cheeses, and choose those with less than 5 grams of fat an ounce. ¨ Try fat-free sour cream, cream cheese, or yogurt. ¨ Avoid cream soups and cream sauces on pasta. ¨ Eat low-fat ice cream, frozen yogurt, or sorbet. Avoid regular ice cream. 
· Eat whole-grain cereals, breads, crackers, rice, or pasta.  Avoid high-fat foods such as croissants, scones, biscuits, waffles, doughnuts, muffins, granola, and high-fat breads. · Flavor your foods with herbs and spices (such as basil, tarragon, or mint), fat-free sauces, or lemon juice instead of butter. You can also use butter substitutes, fat-free mayonnaise, or fat-free dressing. · Try applesauce, prune puree, or mashed bananas to replace some or all of the fat when you bake. · Limit fats and oils, such as butter, margarine, mayonnaise, and salad dressing, to no more than 1 tablespoon a meal. 
· Avoid high-fat foods, such as: 
¨ Chocolate, whole milk, ice cream, and processed cheese. ¨ Fried or buttered foods. ¨ Sausage, salami, and paula. ¨ Cinnamon rolls, cakes, pies, cookies, and other pastries. ¨ Prepared snack foods, such as potato chips, nut and granola bars, and mixed nuts. ¨ Coconut and avocado. · Learn how to read food labels for serving sizes and ingredients. Fast-food and convenience-food meals often have lots of fat. Where can you learn more? Go to http://jamar-marcello.info/. Enter Y451 in the search box to learn more about \"Low-Fat Diet for Gallbladder Disease: Care Instructions. \" Current as of: May 12, 2017 Content Version: 11.7 © 9453-8232 [employer]. Care instructions adapted under license by [employer] (which disclaims liability or warranty for this information). If you have questions about a medical condition or this instruction, always ask your healthcare professional. [place] [address] any warranty or liability for your use of this information. Please provide this summary of care documentation to your next provider. Signatures-by signing, you are acknowledging that this After Visit Summary has been reviewed with you and you have received a copy. Patient Signature:  ____________________________________________________________ Date:  ____________________________________________________________  
  
Scharlene Alosa Provider Signature:  ____________________________________________________________ Date:  ____________________________________________________________

## 2018-08-25 NOTE — ED NOTES
TRANSFER - ED to INPATIENT REPORT:    SBAR report made available to receiving floor on this patient being transferred to [address] (2200)  for routine progression of care       Admitting diagnosis Pancreatitis    Information from the following report(s) SBAR, ED Summary, Intake/Output and MAR was made available to receiving floor. Lines:   Peripheral IV 08/25/18 Right Antecubital (Active)   Site Assessment Clean, dry, & intact 8/25/2018  3:40 PM   Phlebitis Assessment 0 8/25/2018  3:40 PM   Infiltration Assessment 0 8/25/2018  3:40 PM   Dressing Status Clean, dry, & intact 8/25/2018  3:40 PM   Hub Color/Line Status Green 8/25/2018  3:40 PM        Medication list confirmed with patient    Opportunity for questions and clarification was provided.       Patient is oriented to time, place, person and situation   Patient is  continent and ambulatory with assist     Valuables transported with patient     Patient transported with:   [employer]

## 2018-08-25 NOTE — ROUTINE PROCESS
Patient arrived from ED with tech by elizabeth. Patient AOx4. Patient had isolation (contact) with history of bed bugs. Patient noted to have rash/bed bug bite marks on body. Patient blood pressure elevated upon arrival to unit, patient medicated with scheduled bp med (metoporlol). Patient oriented to the room. Denies pain at this time. No s/sx of distress noted at this time. Patient does not want to take jeans off at this time. He states he will take them off after he gets some rest since his pain has decreased since being medicated prior to his admission from the ED.      1915: Report given to Shania Gray RN (on coming nurse) from [facility] (offgoing nurse). Report included SBAR, Kardex, and MAR. Patient resting in bed, asleep at this time. No signs of distress.  Call light and possessions within reach

## 2018-08-25 NOTE — ED TRIAGE NOTES
Pt arrived via EMS with c/o abdominal pain x 3 days. Nausea vomiting x 3 days also. Pt with bm liquid stool last night, prior to that normal bm's. Passing urine with pain and frequency due to lasix.

## 2018-08-25 NOTE — IP AVS SNAPSHOT
Summary of Care Report The Summary of Care report has been created to help improve care coordination. Users with access to [employer] or [facility] [employer] [facility] [employer] [facility] (Web-based application) may access additional patient information including the Discharge Summary. If you are not currently a [address] [facility] user and need more information, please call the number listed below in the [place] [address] section and ask to be connected with Medical Records. Facility Information Name Address Phone [facility] [address] [facility] [address] [phone] [phone] Patient Information Patient Name Sex  Fletcher Yun ([id number]) Male 1960 Discharge Information Admitting Provider Service Area Unit Ernie Weller MD /  Alia Kwon / [phone] Discharge Provider Discharge Date/Time Discharge Disposition Destination (none) 2018 Afternoon (Pending) AHR (none) Patient Language Language ENGLISH [13] Hospital Problems as of 2018  Reviewed: 7/3/2017 12:56 PM by Jodee Colon MD  
  
  
  
 Class Noted - Resolved Last Modified POA Active Problems Coronary artery disease involving coronary bypass graft of native heart without angina pectoris  2016 - Present 2018 by Ernie Weller MD Yes Entered by Jodee Colon MD  
  Type II diabetes mellitus, uncontrolled ([address]  2016 - Present 2018 by Ernie Weller MD Yes Entered by Jodee Colon MD  
  Essential hypertension with goal blood pressure less than 140/90  2016 - Present 2018 by Ernie Weller MD Yes Entered by Jodee Colon MD  
  Pancreatitis  2018 - Present 2018 by Ernie Weller MD Unknown   Entered by Ernie Weller MD  
 * (Principal)Calculus of gallbladder without cholecystitis  8/26/2018 - Present 8/26/2018 by Sherie Leggett MD Yes Entered by Sherie Leggett MD  
  Morbid obesity ([facility]  8/26/2018 - Present 8/26/2018 by Sherie Leggett MD Yes Entered by Sherie Leggett MD  
  
Non-Hospital Problems as of 8/29/2018  Reviewed: 7/3/2017 12:56 PM by Alicia Hunt MD  
  
  
  
 Class Noted - Resolved Last Modified Active Problems Vitamin D deficiency  7/25/2016 - Present 7/25/2016 by Alicia Hunt MD  
  Entered by Alicia Hunt MD  
  Microalbuminuria due to type 2 diabetes mellitus ([facility]  9/22/2016 - Present 9/22/2016 by Alicia Hunt MD  
  Entered by Alicia Hunt MD  
  Moderate single current episode of major depressive disorder ([facility]  12/15/2016 - Present 12/15/2016 by Alicia Hunt MD  
  Entered by Alicia Hunt MD  
  
You are allergic to the following No active allergies Current Discharge Medication List  
  
CONTINUE these medications which have CHANGED Dose & Instructions Dispensing Information Comments  
 insulin glargine 100 unit/mL injection Commonly known as:  LANTUS What changed:  how much to take Dose:  70 Units 70 Units by SubCUTAneous route daily. Quantity:  30 mL Refills:  5 CONTINUE these medications which have NOT CHANGED Dose & Instructions Dispensing Information Comments  
 albuterol 90 mcg/actuation inhaler Commonly known as:  PROVENTIL HFA, VENTOLIN HFA, PROAIR HFA Dose:  2 Puff Take 2 Puffs by inhalation every six (6) hours as needed for Wheezing. Quantity:  1 Inhaler Refills:  3  
   
 aspirin delayed-release 81 mg tablet Dose:  81 mg Take 1 Tab by mouth daily. Quantity:  90 Tab Refills:  2 Blood-Glucose Meter monitoring kit Use as directed Quantity:  1 Kit Refills:  0  
   
 cholecalciferol 1,000 unit tablet Commonly known as:  VITAMIN D3  
 Dose:  2000 Units Take 2 Tabs by mouth daily. Quantity:  100 Tab Refills:  2  
   
 clobetasol 0.05 % ointment Commonly known as:  Jarrell Doctor Apply  to affected area two (2) times a day. Quantity:  60 g Refills:  2 FLUoxetine 20 mg capsule Commonly known as:  PROzac Dose:  20 mg Take 1 Cap by mouth daily. Quantity:  30 Cap Refills:  2  
   
 furosemide 20 mg tablet Commonly known as:  LASIX Dose:  20 mg Take 1 Tab by mouth daily. Quantity:  30 Tab Refills:  2  
   
 gemfibrozil 600 mg tablet Commonly known as:  LOPID Dose:  600 mg Take 1 Tab by mouth two (2) times a day. Quantity:  180 Tab Refills:  1  
   
 glucose blood VI test strips strip Commonly known as:  blood glucose test  
 Use as directed Quantity:  200 Strip Refills:  5 Insulin Syringes (Disposable) 1 mL Syrg Use once a day. Diagnosis 250.00 Quantity:  100 Syringe Refills:  5 Lancets Misc Use as directed Quantity:  200 Each Refills:  5  
   
 lisinopril 40 mg tablet Commonly known as:  Rosalva [place] Dose:  40 mg Take 1 Tab by mouth daily. Quantity:  30 Tab Refills:  2  
   
 metFORMIN 1,000 mg tablet Commonly known as:  GLUCOPHAGE Dose:  1000 mg Take 1 Tab by mouth two (2) times daily (with meals). Quantity:  60 Tab Refills:  5  
   
 metoprolol tartrate 50 mg tablet Commonly known as:  LOPRESSOR Dose:  50 mg Take 1 Tab by mouth two (2) times a day. Quantity:  60 Tab Refills:  3 Current Immunizations Name Date Pneumococcal Polysaccharide (PPSV-23) 6/5/2017 Surgery Information ID Date/Time Status Primary Surgeon All Procedures Location [id number] 8/28/2018 [address] [facility] Kay Chen MD Attempt at [facility] CHOLECYSTECTOMY MULTIPORT ROBOTIC ASSISTED [facility] MAIN OR Follow-up Information Follow up With Details Comments Contact Info Phys Other, MD   Patient can only remember the practice name and not the physician Discharge Instructions DISCHARGE SUMMARY from Nurse PATIENT INSTRUCTIONS: 
 
 
F-face looks uneven A-arms unable to move or move unevenly S-speech slurred or non-existent T-time-call 911 as soon as signs and symptoms begin-DO NOT go Back to bed or wait to see if you get better-TIME IS BRAIN. Warning Signs of HEART ATTACK Call 911 if you have these symptoms: 
? Chest discomfort. Most heart attacks involve discomfort in the center of the chest that lasts more than a few minutes, or that goes away and comes back. It can feel like uncomfortable pressure, squeezing, fullness, or pain. ? Discomfort in other areas of the upper body. Symptoms can include pain or discomfort in one or both arms, the back, neck, jaw, or stomach. ? Shortness of breath with or without chest discomfort. ? Other signs may include breaking out in a cold sweat, nausea, or lightheadedness. Don't wait more than five minutes to call [address]! Fast action can save your life. Calling 911 is almost always the fastest way to get lifesaving treatment. Emergency Medical Services staff can begin treatment when they arrive  up to an hour sooner than if someone gets to the hospital by car. The discharge information has been reviewed with the patient. The patient verbalized understanding. Discharge medications reviewed with the patient and appropriate educational materials and side effects teaching were provided. Patient armband removed and shredded.  
___________________________________________________________________________ ________________________________________________________ Chart Review Routing History No Routing History on File

## 2018-08-26 PROBLEM — E66.01 MORBID OBESITY (HCC): Status: ACTIVE | Noted: 2018-08-26

## 2018-08-26 PROBLEM — K80.20 CALCULUS OF GALLBLADDER WITHOUT CHOLECYSTITIS: Status: ACTIVE | Noted: 2018-08-26

## 2018-08-26 LAB
ANION GAP SERPL CALC-SCNC: 5 MMOL/L (ref 3–18)
BASOPHILS # BLD: 0 K/UL (ref 0–0.1)
BASOPHILS NFR BLD: 0 % (ref 0–2)
BUN SERPL-MCNC: 14 MG/DL (ref 7–18)
BUN/CREAT SERPL: 13 (ref 12–20)
CALCIUM SERPL-MCNC: 8.4 MG/DL (ref 8.5–10.1)
CHLORIDE SERPL-SCNC: 103 MMOL/L (ref 100–108)
CO2 SERPL-SCNC: 30 MMOL/L (ref 21–32)
CREAT SERPL-MCNC: 1.06 MG/DL (ref 0.6–1.3)
DIFFERENTIAL METHOD BLD: ABNORMAL
EOSINOPHIL # BLD: 0 K/UL (ref 0–0.4)
EOSINOPHIL NFR BLD: 0 % (ref 0–5)
ERYTHROCYTE [DISTWIDTH] IN BLOOD BY AUTOMATED COUNT: 14.9 % (ref 11.6–14.5)
GLUCOSE BLD STRIP.AUTO-MCNC: 256 MG/DL (ref 70–110)
GLUCOSE BLD STRIP.AUTO-MCNC: 278 MG/DL (ref 70–110)
GLUCOSE SERPL-MCNC: 265 MG/DL (ref 74–99)
HCT VFR BLD AUTO: 41.3 % (ref 36–48)
HGB BLD-MCNC: 13.4 G/DL (ref 13–16)
LIPASE SERPL-CCNC: 1082 U/L (ref 73–393)
LYMPHOCYTES # BLD: 1.1 K/UL (ref 0.9–3.6)
LYMPHOCYTES NFR BLD: 11 % (ref 21–52)
MCH RBC QN AUTO: 30 PG (ref 24–34)
MCHC RBC AUTO-ENTMCNC: 32.4 G/DL (ref 31–37)
MCV RBC AUTO: 92.6 FL (ref 74–97)
MONOCYTES # BLD: 0.7 K/UL (ref 0.05–1.2)
MONOCYTES NFR BLD: 7 % (ref 3–10)
NEUTS SEG # BLD: 8.2 K/UL (ref 1.8–8)
NEUTS SEG NFR BLD: 82 % (ref 40–73)
PLATELET # BLD AUTO: 221 K/UL (ref 135–420)
PMV BLD AUTO: 8.9 FL (ref 9.2–11.8)
POTASSIUM SERPL-SCNC: 4.4 MMOL/L (ref 3.5–5.5)
RBC # BLD AUTO: 4.46 M/UL (ref 4.7–5.5)
SODIUM SERPL-SCNC: 138 MMOL/L (ref 136–145)
WBC # BLD AUTO: 10 K/UL (ref 4.6–13.2)

## 2018-08-26 PROCEDURE — 80048 BASIC METABOLIC PNL TOTAL CA: CPT | Performed by: HOSPITALIST

## 2018-08-26 PROCEDURE — 83690 ASSAY OF LIPASE: CPT | Performed by: HOSPITALIST

## 2018-08-26 PROCEDURE — 74011250636 HC RX REV CODE- 250/636: Performed by: HOSPITALIST

## 2018-08-26 PROCEDURE — 85025 COMPLETE CBC W/AUTO DIFF WBC: CPT | Performed by: HOSPITALIST

## 2018-08-26 PROCEDURE — 74011250636 HC RX REV CODE- 250/636

## 2018-08-26 PROCEDURE — 82962 GLUCOSE BLOOD TEST: CPT

## 2018-08-26 PROCEDURE — 74011250637 HC RX REV CODE- 250/637: Performed by: HOSPITALIST

## 2018-08-26 PROCEDURE — 36415 COLL VENOUS BLD VENIPUNCTURE: CPT | Performed by: HOSPITALIST

## 2018-08-26 PROCEDURE — 74011636637 HC RX REV CODE- 636/637: Performed by: HOSPITALIST

## 2018-08-26 PROCEDURE — 65270000029 HC RM PRIVATE

## 2018-08-26 RX ORDER — MORPHINE SULFATE 4 MG/ML
INJECTION, SOLUTION INTRAMUSCULAR; INTRAVENOUS
Status: DISPENSED
Start: 2018-08-26 | End: 2018-08-26

## 2018-08-26 RX ORDER — MORPHINE SULFATE 2 MG/ML
1 INJECTION, SOLUTION INTRAMUSCULAR; INTRAVENOUS
Status: DISCONTINUED | OUTPATIENT
Start: 2018-08-26 | End: 2018-08-28

## 2018-08-26 RX ORDER — INSULIN LISPRO 100 [IU]/ML
INJECTION, SOLUTION INTRAVENOUS; SUBCUTANEOUS EVERY 6 HOURS
Status: DISCONTINUED | OUTPATIENT
Start: 2018-08-26 | End: 2018-08-29 | Stop reason: HOSPADM

## 2018-08-26 RX ORDER — INSULIN GLARGINE 100 [IU]/ML
10 INJECTION, SOLUTION SUBCUTANEOUS EVERY 12 HOURS
Status: DISCONTINUED | OUTPATIENT
Start: 2018-08-26 | End: 2018-08-27

## 2018-08-26 RX ORDER — MORPHINE SULFATE IN 0.9 % NACL 1 MG/ML
1 PLASTIC BAG, INJECTION (ML) INTRAVENOUS
Status: DISCONTINUED | OUTPATIENT
Start: 2018-08-26 | End: 2018-08-26

## 2018-08-26 RX ADMIN — INSULIN LISPRO 9 UNITS: 100 INJECTION, SOLUTION INTRAVENOUS; SUBCUTANEOUS at 12:24

## 2018-08-26 RX ADMIN — INSULIN LISPRO 9 UNITS: 100 INJECTION, SOLUTION INTRAVENOUS; SUBCUTANEOUS at 18:00

## 2018-08-26 RX ADMIN — LISINOPRIL 40 MG: 40 TABLET ORAL at 10:26

## 2018-08-26 RX ADMIN — FLUOXETINE 20 MG: 20 CAPSULE ORAL at 10:26

## 2018-08-26 RX ADMIN — DEXTROSE MONOHYDRATE, SODIUM CHLORIDE, AND POTASSIUM CHLORIDE 100 ML/HR: 50; 4.5; 1.49 INJECTION, SOLUTION INTRAVENOUS at 05:11

## 2018-08-26 RX ADMIN — DEXTROSE MONOHYDRATE, SODIUM CHLORIDE, AND POTASSIUM CHLORIDE 100 ML/HR: 50; 4.5; 1.49 INJECTION, SOLUTION INTRAVENOUS at 15:43

## 2018-08-26 RX ADMIN — DEXTROSE MONOHYDRATE, SODIUM CHLORIDE, AND POTASSIUM CHLORIDE 100 ML/HR: 50; 4.5; 1.49 INJECTION, SOLUTION INTRAVENOUS at 23:55

## 2018-08-26 RX ADMIN — INSULIN GLARGINE 10 UNITS: 100 INJECTION, SOLUTION SUBCUTANEOUS at 12:25

## 2018-08-26 RX ADMIN — INSULIN LISPRO 6 UNITS: 100 INJECTION, SOLUTION INTRAVENOUS; SUBCUTANEOUS at 23:53

## 2018-08-26 RX ADMIN — INSULIN GLARGINE 10 UNITS: 100 INJECTION, SOLUTION SUBCUTANEOUS at 21:45

## 2018-08-26 RX ADMIN — METOPROLOL TARTRATE 50 MG: 50 TABLET ORAL at 10:26

## 2018-08-26 RX ADMIN — METOPROLOL TARTRATE 50 MG: 50 TABLET ORAL at 19:51

## 2018-08-26 RX ADMIN — MORPHINE SULFATE 1 MG: 2 INJECTION, SOLUTION INTRAMUSCULAR; INTRAVENOUS at 22:03

## 2018-08-26 NOTE — ROUTINE PROCESS
0730 Received report from [facility]. Patient asleep, easily awaken. Patient still wearing his pants and shoes on his foot on the bed.     1100 Patient cleaned and bed bath done. Clothes and shoes put in the patient's belongings bag. Knives and keys sent to security. Noted bug bites all over patient. 1400 Patient resting int he bed. Denies any abdominal pain. 1550 Bedside and Verbal shift change report given to [facility] (oncoming nurse) by Gladis Goff RN (offgoing nurse). Report included the following information SBAR, Kardex, Intake/Output, MAR and Recent Results.

## 2018-08-26 NOTE — H&P
History and Physical    Patient: Farhat Rust               Sex: male          DOA: 8/25/2018       YOB: 1960      Age:  62 y.o.        LOS:  LOS: 1 day        HPI:     Farhat Rust is a 62 y.o. male who presented to the ER with back pain and abdominal pain. This began 3 days ago and has been slowly worsening in his back. The pain in his abdomen began yesterday. He had associated vomiting without diarrhea. No fever. He has had similar pain with kidney stone in the past, but the kidney stone pain was more severe. In the ER the patient was found to have pancreatitis as well as elevated Bilirubin and LFT's. He is admitted for ongoing management. Past Medical History:   Diagnosis Date    Coronary artery disease     Depression     DM (diabetes mellitus screen)     Heart disease     Hyperlipidemia     Kidney calculi        Social History:   Tobacco use:  Patient has smoked since age 25. Alcohol use:  Patient does not use alcohol   Occupation:  Patient is partially retired from [employer]    Family History: Mother had diabetes   Father had CAD and Alzheimer's type dementia    Surgical History:   Benign tumor removed in 2000 from kidney   Previous CABG surgery     Review of Systems    Constitutional:  No fever or weight loss  HEENT:  No headache or visual changes  Cardiovascular:  No chest pain or diaphoresis  Respiratory:  No coughing, wheezing, or shortness of breath.   GI:  Abdominal pain with vomiting as above, no diarrhea  :  No hematuria or dysuria  Skin:  No rashes or moles  Neuro:  No seizures or syncope  Hematological:  No bruising or bleeding  Endocrine:  Patient has known diabetes, no thyroid disease    Physical Exam:      Visit Vitals    BP (!) 163/96    Pulse 88    Temp 98.6 °F (37 °C)    Resp 24    Ht 6' 1\" (1.854 m)    Wt 145.6 kg (321 lb)    SpO2 97%    BMI 42.35 kg/m2       Physical Exam:    Gen:  No distress, alert  HEENT:  Normal cephalic atraumatic, extra-occular movements are intact. Neck:  Supple, No JVD  Lungs:  Clear bilaterally, no wheeze, no rales, normal effort  Heart:  Regular Rate and Rhythm, normal S1 and S2, no edema  Abdomen:  Soft, non tender, normal bowel sounds, no guarding.   Extremities:  Well perfused, no cyanosis or edema  Neurological:  Awake and alert, CN's are intact, normal strength throughout extremities  Skin:  No rashes or moles  Mental Status:  Normal thought process, does not appear anxious    Laboratory Studies:    BMP:   Lab Results   Component Value Date/Time     08/26/2018 07:55 AM    K 4.4 08/26/2018 07:55 AM     08/26/2018 07:55 AM    CO2 30 08/26/2018 07:55 AM    AGAP 5 08/26/2018 07:55 AM     (H) 08/26/2018 07:55 AM    BUN 14 08/26/2018 07:55 AM    CREA 1.06 08/26/2018 07:55 AM    GFRAA >60 08/26/2018 07:55 AM    GFRNA >60 08/26/2018 07:55 AM     CBC:   Lab Results   Component Value Date/Time    WBC 10.0 08/26/2018 07:55 AM    HGB 13.4 08/26/2018 07:55 AM    HCT 41.3 08/26/2018 07:55 AM     08/26/2018 07:55 AM     Liver Panel:   Lab Results   Component Value Date/Time    ALB 3.4 08/25/2018 03:40 PM    TP 7.2 08/25/2018 03:40 PM    GLOB 3.8 08/25/2018 03:40 PM    AGRAT 0.9 08/25/2018 03:40 PM    SGOT 820 (H) 08/25/2018 03:40 PM     (H) 08/25/2018 03:40 PM     (H) 08/25/2018 03:40 PM     Pancreatic Markers:   Lab Results   Component Value Date/Time    LPSE 1082 (H) 08/26/2018 07:55 AM       Assessment/Plan     Principal Problem:    Calculus of gallbladder without cholecystitis (8/26/2018)    Active Problems:    Pancreatitis (8/25/2018)      Type II diabetes mellitus, uncontrolled ([address] (7/25/2016)      Coronary artery disease involving coronary bypass graft of native heart without angina pectoris (7/25/2016)      Essential hypertension with goal blood pressure less than 140/90 (9/22/2016)      Morbid obesity ([address] (8/26/2018)        PLAN:    Maintain NPO, follow Lipase  Ultrasound shows cholelithiasis, will consult with General Surgery  Discussed with Dr Dianelys ESPINOSA control  BP control  Pain control  DVT prophylaxis

## 2018-08-26 NOTE — PROGRESS NOTES
Chart reviewed and pt verified information. He has VA and secondary insurance and sees a [place] doctor, not sure of MD name because they are new. He ayse the [place] Preference to transfer form and I will fax to [place]. He lives alone and is independent with  ADL. He has walker and cane  At  home, from when he had a Triple bypass at [facility]. He says he has never used home health.     Reason for Admission:  pancreatitis                    RRAT Score:   7                  Plan for utilizing home health:   TBD                       Likelihood of Readmission: low if treated                         Transition of Care Plan:        home

## 2018-08-26 NOTE — PROGRESS NOTES
Problem: Falls - Risk of  Goal: *Absence of Falls  Document Fabienne Fall Risk and appropriate interventions in the flowsheet.    Outcome: Progressing Towards Goal  Fall Risk Interventions:  Mobility Interventions: Patient to call before getting OOB              Elimination Interventions: Call light in reach, Bed/chair exit alarm

## 2018-08-26 NOTE — PROGRESS NOTES
Progress Note      Patient: Laura Nathan               Sex: male          DOA: 8/25/2018       YOB: 1960      Age:  62 y.o.        LOS:  LOS: 1 day             CHIEF COMPLAINT:  Cholelithiasis    Subjective:     Pain is a little better  No vomiting    Objective:      Visit Vitals    BP (!) 163/96    Pulse 88    Temp 98.6 °F (37 °C)    Resp 24    Ht 6' 1\" (1.854 m)    Wt 145.6 kg (321 lb)    SpO2 97%    BMI 42.35 kg/m2       Physical Exam:  Gen:  No distress, no complaint  Lungs:  Clear bilaterally, no wheeze or rhonchi  Heart:  Regular rate and rhythm, no murmurs or gallops  Abdomen:  Soft, non-tender, normal bowel sounds        Lab/Data Reviewed:  Liver Panel:   Lab Results   Component Value Date/Time    ALB 3.4 08/25/2018 03:40 PM    TP 7.2 08/25/2018 03:40 PM    GLOB 3.8 08/25/2018 03:40 PM    AGRAT 0.9 08/25/2018 03:40 PM    SGOT 820 (H) 08/25/2018 03:40 PM     (H) 08/25/2018 03:40 PM     (H) 08/25/2018 03:40 PM     Pancreatic Markers:   Lab Results   Component Value Date/Time    LPSE 1082 (H) 08/26/2018 07:55 AM           Assessment/Plan     Principal Problem:    Calculus of gallbladder without cholecystitis (8/26/2018)    Active Problems:    Pancreatitis (8/25/2018)      Type II diabetes mellitus, uncontrolled ([address] (7/25/2016)      Coronary artery disease involving coronary bypass graft of native heart without angina pectoris (7/25/2016)      Essential hypertension with goal blood pressure less than 140/90 (9/22/2016)      Morbid obesity ([address] (8/26/2018)        Plan:  Following LFT's   Surgery consult  BS control  BP control  DVT prophylaxis.

## 2018-08-26 NOTE — PROGRESS NOTES
0730: Bedside and Verbal shift change report given to Vic Flores RN (oncoming nurse) by Armond Mahmood RN (offgoing nurse). Report included the following information SBAR, Kardex and MAR.

## 2018-08-26 NOTE — PROGRESS NOTES
[place] Preference to transfer form faxed to [phone] with H/P, progress note, demographic sheet. Being Sunday, I can not call to find % connection with the [place]. Collette [facility] [employer]  Management  [phone], beeper [phone]

## 2018-08-26 NOTE — PROGRESS NOTES
1920: Assumed patient care. Received report from [place], [address] [place] [address] (offgoing nurse). Report included SBAR, kardex, and MAR. Patient asleep at this time, in no signs of distress or pain, call light and possessions within reach. Bed in lowest setting. 2155: Patient's blood pressure elevated, 168/98, Dr. Mandi Montenegro paged. 2153: Patient able to be easily aroused but extremely drowsy at this time, unable to complete PTA med list at this time with patient    [phone]: Spoke with Dr. Mandi Montenegro, informed doctor of patient's blood pressure, doctor ordered a one time dose of 50 mg metropolol. Repeat back provided    2340: Ambulated patient to restroom, steady gait. 2121: Blood pressure reduced to 160/96    0514: Patient stated \"everything is sore\" when asked to rate pain on pain scale patient replied \"it hurts! \". No pain medications ordered, paged Dr. Finnegan Apa: Spoke with Dr. Mandi Montenegro received verbal order for 1 mg morphine IV q4 hours PRN repeat back provided    0616: Entered patient's room to administer morphine, patient continues to be incredibly drowsy. When asked how much pain he is in, patient keeps eyes closed and says \"some\". When asked to rate pain, patient states \"right now it's not too bad\". Please see POSS score. Morphine held. Asked patient to inform nurse if pain intensifies. Will continue to monitor.

## 2018-08-26 NOTE — CONSULTS
General Surgery Consult      Sonal Galindo  Admit date: 2018    MRN: [id number]     : 1960     Age: 62 y.o. Attending Physician: Juan Zuñiga MD, FACS      Subjective:     Lupe Schuster is a 62 y.o. male who is being admitted with gallstone pancreatitis. The patient presented with abdominal pain as well as back pain. The pain started 3 days ago. He has had nausea and vomiting. The patient  has not had jaundice, acholic stools or dark urine and has had a history of pancreatitis. Findings of ultrasound of the abdomen: gallstones. Patient Active Problem List    Diagnosis Date Noted    Calculus of gallbladder without cholecystitis 2018    Morbid obesity ([facility] 2018    Pancreatitis 2018    Moderate single current episode of major depressive disorder ([facility] 12/15/2016    Essential hypertension with goal blood pressure less than 140/90 2016    Microalbuminuria due to type 2 diabetes mellitus ([facility] 2016    Coronary artery disease involving coronary bypass graft of native heart without angina pectoris 2016    Type II diabetes mellitus, uncontrolled ([facility] 2016    Vitamin D deficiency 2016     Past Medical History:   Diagnosis Date    Coronary artery disease     Depression     DM (diabetes mellitus screen)     Heart disease     Hyperlipidemia     Kidney calculi       Past Surgical History:   Procedure Laterality Date    HX OTHER SURGICAL      pt stated he had a triple bypass    HX OTHER SURGICAL      excision of right benign adrenal mass       Social History   Substance Use Topics    Smoking status: Current Every Day Smoker     Packs/day: 0.25    Smokeless tobacco: Never Used      Comment: 1 cigarette a day     Alcohol use No      Comment: Former smoker      History   Smoking Status    Current Every Day Smoker    Packs/day: 0.25   Smokeless Tobacco    Never Used     Comment: 1 cigarette a day      History reviewed.  No pertinent family history. Current Facility-Administered Medications   Medication Dose Route Frequency    morphine injection 1 mg  1 mg IntraVENous Q4H PRN    morphine 4 mg/mL injection        insulin lispro (HUMALOG) injection   SubCUTAneous Q6H    insulin glargine (LANTUS) injection 10 Units  10 Units SubCUTAneous Q12H    FLUoxetine (PROzac) capsule 20 mg  20 mg Oral DAILY    lisinopril (PRINIVIL, ZESTRIL) tablet 40 mg  40 mg Oral DAILY    metoprolol tartrate (LOPRESSOR) tablet 50 mg  50 mg Oral BID    dextrose 5% - 0.45% NaCl with KCl 20 mEq/L infusion  100 mL/hr IntraVENous CONTINUOUS    glucose chewable tablet 16 g  4 Tab Oral PRN    glucagon (GLUCAGEN) injection 1 mg  1 mg IntraMUSCular PRN    dextrose (D50W) injection syrg 12.5-25 g  25-50 mL IntraVENous PRN      No Known Allergies     Review of Systems:  Constitutional: negative  Eyes: negative  Ears, Nose, Mouth, Throat, and Face: negative  Respiratory: negative  Cardiovascular: negative  Gastrointestinal: positive for nausea, vomiting and abdominal pain  Genitourinary:negative  Integument/Breast: negative  Hematologic/Lymphatic: negative  Musculoskeletal:positive for back pain  Neurological: negative  Behavioral/Psychiatric: negative  Endocrine: negative  Allergic/Immunologic: negative    Objective:     Visit Vitals    BP (!) 143/93    Pulse 89    Temp 98.4 °F (36.9 °C)    Resp 20    Ht 6' 1\" (1.854 m)    Wt 145.6 kg (321 lb)    SpO2 94%    BMI 42.35 kg/m2       Physical Exam:      General:  in no apparent distress, normal vitals and anicteric   Eyes:  conjunctivae and sclerae normal, pupils equal, round, reactive to light   Throat & Neck: no erythema or exudates noted and neck supple and symmetrical; no palpable masses   Lungs:   clear to auscultation bilaterally   Heart:  Regular rate and rhythm   Abdomen:   rounded and obese, soft, non tender except for epigastric tenderness, nondistended, no masses or organomegaly. No abdominal wall hernias. Extremities: extremities normal, atraumatic, no cyanosis or edema   Skin: Normal.         Imaging and Lab Review:     CBC:   Lab Results   Component Value Date/Time    WBC 10.0 08/26/2018 07:55 AM    RBC 4.46 (L) 08/26/2018 07:55 AM    HGB 13.4 08/26/2018 07:55 AM    HCT 41.3 08/26/2018 07:55 AM    PLATELET 109 14/76/5699 07:55 AM     BMP:   Lab Results   Component Value Date/Time    Glucose 265 (H) 08/26/2018 07:55 AM    Sodium 138 08/26/2018 07:55 AM    Potassium 4.4 08/26/2018 07:55 AM    Chloride 103 08/26/2018 07:55 AM    CO2 30 08/26/2018 07:55 AM    BUN 14 08/26/2018 07:55 AM    Creatinine 1.06 08/26/2018 07:55 AM    Calcium 8.4 (L) 08/26/2018 07:55 AM     CMP:  Lab Results   Component Value Date/Time    Glucose 265 (H) 08/26/2018 07:55 AM    Sodium 138 08/26/2018 07:55 AM    Potassium 4.4 08/26/2018 07:55 AM    Chloride 103 08/26/2018 07:55 AM    CO2 30 08/26/2018 07:55 AM    BUN 14 08/26/2018 07:55 AM    Creatinine 1.06 08/26/2018 07:55 AM    Calcium 8.4 (L) 08/26/2018 07:55 AM    Anion gap 5 08/26/2018 07:55 AM    BUN/Creatinine ratio 13 08/26/2018 07:55 AM    Alk.  phosphatase 160 (H) 08/25/2018 03:40 PM    Protein, total 7.2 08/25/2018 03:40 PM    Albumin 3.4 08/25/2018 03:40 PM    Globulin 3.8 08/25/2018 03:40 PM    A-G Ratio 0.9 08/25/2018 03:40 PM       Recent Results (from the past 24 hour(s))   EKG, 12 LEAD, INITIAL    Collection Time: 08/25/18  4:32 PM   Result Value Ref Range    Ventricular Rate 84 BPM    Atrial Rate 84 BPM    P-R Interval 188 ms    QRS Duration 120 ms    Q-T Interval 418 ms    QTC Calculation (Bezet) 493 ms    Calculated P Axis 42 degrees    Calculated R Axis 38 degrees    Calculated T Axis 47 degrees    Diagnosis       Normal sinus rhythm  Nonspecific intraventricular conduction delay  Nonspecific T wave abnormality  Abnormal ECG  No previous ECGs available     GLUCOSE, POC    Collection Time: 08/25/18 10:34 PM   Result Value Ref Range    Glucose (POC) 266 (H) 70 - 110 mg/dL CBC WITH AUTOMATED DIFF    Collection Time: 08/26/18  7:55 AM   Result Value Ref Range    WBC 10.0 4.6 - 13.2 K/uL    RBC 4.46 (L) 4.70 - 5.50 M/uL    HGB 13.4 13.0 - 16.0 g/dL    HCT 41.3 36.0 - 48.0 %    MCV 92.6 74.0 - 97.0 FL    MCH 30.0 24.0 - 34.0 PG    MCHC 32.4 31.0 - 37.0 g/dL    RDW 14.9 (H) 11.6 - 14.5 %    PLATELET 124 642 - 219 K/uL    MPV 8.9 (L) 9.2 - 11.8 FL    NEUTROPHILS 82 (H) 40 - 73 %    LYMPHOCYTES 11 (L) 21 - 52 %    MONOCYTES 7 3 - 10 %    EOSINOPHILS 0 0 - 5 %    BASOPHILS 0 0 - 2 %    ABS. NEUTROPHILS 8.2 (H) 1.8 - 8.0 K/UL    ABS. LYMPHOCYTES 1.1 0.9 - 3.6 K/UL    ABS. MONOCYTES 0.7 0.05 - 1.2 K/UL    ABS. EOSINOPHILS 0.0 0.0 - 0.4 K/UL    ABS. BASOPHILS 0.0 0.0 - 0.1 K/UL    DF AUTOMATED     METABOLIC PANEL, BASIC    Collection Time: 08/26/18  7:55 AM   Result Value Ref Range    Sodium 138 136 - 145 mmol/L    Potassium 4.4 3.5 - 5.5 mmol/L    Chloride 103 100 - 108 mmol/L    CO2 30 21 - 32 mmol/L    Anion gap 5 3.0 - 18 mmol/L    Glucose 265 (H) 74 - 99 mg/dL    BUN 14 7.0 - 18 MG/DL    Creatinine 1.06 0.6 - 1.3 MG/DL    BUN/Creatinine ratio 13 12 - 20      GFR est AA >60 >60 ml/min/1.73m2    GFR est non-AA >60 >60 ml/min/1.73m2    Calcium 8.4 (L) 8.5 - 10.1 MG/DL   LIPASE    Collection Time: 08/26/18  7:55 AM   Result Value Ref Range    Lipase 1082 (H) 73 - 393 U/L   GLUCOSE, POC    Collection Time: 08/26/18 12:09 PM   Result Value Ref Range    Glucose (POC) 278 (H) 70 - 110 mg/dL       images and reports reviewed    Assessment:   Liz Garcia is a 62 y.o. male is presenting with abdominal pain and a picture of gallstone pancreatitis. I explained the indications for robotic cholecystectomy as well as the alternatives. I discussed the potential risks, including but not limited to bleeding, wound infection, trocar injuries, bile duct injury and leak, and also the possible need for conversion to open procedure.  I also explained the firefly technology and how it allow us to visualize the biliary tree to avoid bile duct injury or leak. He indicates that he understands the risks, accepts and wishes to proceed. Plan:     IV fluids  NPO  Repeat labs daily  Once getting better, will schedule for robotic cholecystectomy with firefly (ICG) technology for identification of the biliary tree, most likely on Tuesday.      Please call me if you have any questions (cell phone: [phone])     Signed By: Juan Zuñiga MD     August 26, 2018

## 2018-08-27 ENCOUNTER — ANESTHESIA EVENT (OUTPATIENT)
Dept: SURGERY | Age: 58
DRG: 335 | End: 2018-08-27
Payer: OTHER GOVERNMENT

## 2018-08-27 LAB
ATRIAL RATE: 84 BPM
CALCULATED P AXIS, ECG09: 42 DEGREES
CALCULATED R AXIS, ECG10: 38 DEGREES
CALCULATED T AXIS, ECG11: 47 DEGREES
DIAGNOSIS, 93000: NORMAL
GLUCOSE BLD STRIP.AUTO-MCNC: 214 MG/DL (ref 70–110)
GLUCOSE BLD STRIP.AUTO-MCNC: 224 MG/DL (ref 70–110)
GLUCOSE BLD STRIP.AUTO-MCNC: 226 MG/DL (ref 70–110)
GLUCOSE BLD STRIP.AUTO-MCNC: 227 MG/DL (ref 70–110)
GLUCOSE BLD STRIP.AUTO-MCNC: 233 MG/DL (ref 70–110)
P-R INTERVAL, ECG05: 188 MS
Q-T INTERVAL, ECG07: 418 MS
QRS DURATION, ECG06: 120 MS
QTC CALCULATION (BEZET), ECG08: 493 MS
VENTRICULAR RATE, ECG03: 84 BPM

## 2018-08-27 PROCEDURE — 74011636637 HC RX REV CODE- 636/637: Performed by: HOSPITALIST

## 2018-08-27 PROCEDURE — 65270000029 HC RM PRIVATE

## 2018-08-27 PROCEDURE — 74011250636 HC RX REV CODE- 250/636: Performed by: HOSPITALIST

## 2018-08-27 PROCEDURE — 74011250637 HC RX REV CODE- 250/637: Performed by: HOSPITALIST

## 2018-08-27 PROCEDURE — 82962 GLUCOSE BLOOD TEST: CPT

## 2018-08-27 RX ORDER — INSULIN GLARGINE 100 [IU]/ML
14 INJECTION, SOLUTION SUBCUTANEOUS EVERY 12 HOURS
Status: DISCONTINUED | OUTPATIENT
Start: 2018-08-27 | End: 2018-08-28

## 2018-08-27 RX ADMIN — DEXTROSE MONOHYDRATE, SODIUM CHLORIDE, AND POTASSIUM CHLORIDE 100 ML/HR: 50; 4.5; 1.49 INJECTION, SOLUTION INTRAVENOUS at 13:13

## 2018-08-27 RX ADMIN — INSULIN GLARGINE 10 UNITS: 100 INJECTION, SOLUTION SUBCUTANEOUS at 10:02

## 2018-08-27 RX ADMIN — INSULIN GLARGINE 14 UNITS: 100 INJECTION, SOLUTION SUBCUTANEOUS at 21:53

## 2018-08-27 RX ADMIN — DEXTROSE MONOHYDRATE, SODIUM CHLORIDE, AND POTASSIUM CHLORIDE 100 ML/HR: 50; 4.5; 1.49 INJECTION, SOLUTION INTRAVENOUS at 22:14

## 2018-08-27 RX ADMIN — FLUOXETINE 20 MG: 20 CAPSULE ORAL at 10:02

## 2018-08-27 RX ADMIN — INSULIN LISPRO 6 UNITS: 100 INJECTION, SOLUTION INTRAVENOUS; SUBCUTANEOUS at 06:50

## 2018-08-27 RX ADMIN — MORPHINE SULFATE 1 MG: 2 INJECTION, SOLUTION INTRAMUSCULAR; INTRAVENOUS at 15:49

## 2018-08-27 RX ADMIN — INSULIN LISPRO 6 UNITS: 100 INJECTION, SOLUTION INTRAVENOUS; SUBCUTANEOUS at 18:27

## 2018-08-27 RX ADMIN — INSULIN LISPRO 6 UNITS: 100 INJECTION, SOLUTION INTRAVENOUS; SUBCUTANEOUS at 13:13

## 2018-08-27 RX ADMIN — METOPROLOL TARTRATE 50 MG: 50 TABLET ORAL at 18:27

## 2018-08-27 RX ADMIN — LISINOPRIL 40 MG: 40 TABLET ORAL at 10:02

## 2018-08-27 RX ADMIN — METOPROLOL TARTRATE 50 MG: 50 TABLET ORAL at 10:02

## 2018-08-27 RX ADMIN — MORPHINE SULFATE 1 MG: 2 INJECTION, SOLUTION INTRAMUSCULAR; INTRAVENOUS at 22:05

## 2018-08-27 NOTE — PROGRESS NOTES
Problem: Falls - Risk of  Goal: *Absence of Falls  Document Fabienne Fall Risk and appropriate interventions in the flowsheet. Outcome: Progressing Towards Goal  Fall Risk Interventions:  Mobility Interventions: Patient to call before getting OOB         Medication Interventions: Assess postural VS orthostatic hypotension, Patient to call before getting OOB    Elimination Interventions: Call light in reach, Toileting schedule/hourly rounds, Urinal in reach             Problem: Pressure Injury - Risk of  Goal: *Prevention of pressure injury  Document Alcides Scale and appropriate interventions in the flowsheet.    Outcome: Progressing Towards Goal  Pressure Injury Interventions:       Moisture Interventions: Apply protective barrier, creams and emollients, Check for incontinence Q2 hours and as needed, Maintain skin hydration (lotion/cream)    Activity Interventions: Pressure redistribution bed/mattress(bed type)    Mobility Interventions: HOB 30 degrees or less, Pressure redistribution bed/mattress (bed type), PT/OT evaluation    Nutrition Interventions: Document food/fluid/supplement intake    Friction and Shear Interventions: HOB 30 degrees or less

## 2018-08-27 NOTE — PROGRESS NOTES
Problem: Pressure Injury - Risk of  Goal: *Prevention of pressure injury  Document Alcides Scale and appropriate interventions in the flowsheet.    Outcome: Progressing Towards Goal  Pressure Injury Interventions:       Moisture Interventions: Maintain skin hydration (lotion/cream)    Activity Interventions: Pressure redistribution bed/mattress(bed type)    Mobility Interventions: HOB 30 degrees or less    Nutrition Interventions: Document food/fluid/supplement intake    Friction and Shear Interventions: HOB 30 degrees or less

## 2018-08-27 NOTE — DIABETES MGMT
Diabetes Patient/Family Education Record    Factors That  May Influence Patients Ability  to Learn or  Comply with Recommendations   []   Language barrier    []   Cultural needs   []   Motivation    []   Cognitive limitation    []   Physical   []   Education    []   Physiological factors   []   Hearing/vision/speaking impairment   []   Restoration beliefs    []   Financial factors   []  Other:   []  No factors identified at this time. Person Instructed:   [x]   Patient   []   Family   []  Other     Preference for Learning:   []   Verbal   []   Written   []  Demonstration     Level of Comprehension & Competence:    []  Good                                      [x] Fair                                     []  Poor                             []  Needs Reinforcement   [x]  Teachback completed    Education Component:   [x]  Medication management, including how to administer insulin (if appropriate) and potential medication interactions taking Lantus 70 units daily and metformin 1000 mg twice a day.    []  Nutritional management    []  Exercise   []  Signs, symptoms, and treatment of hyperglycemia and hypoglycemia   [] Prevention, recognition and treatment of hyperglycemia and hypoglycemia   []  Importance of blood glucose monitoring and how to obtain a blood glucose meter    []  Instruction on use of the blood glucose meter   [x]  Discuss the importance of HbA1C monitoring    []  Sick day guidelines   []  Proper use and disposal of lancets, needles, syringes or insulin pens (if appropriate)   [x]  Potential long-term complications (retinopathy, kidney disease, neuropathy, foot care)   [x] Information about whom to contact in case of emergency or for more information    [x]  Goal:  Patient/family will demonstrate understanding of Diabetes Self Management Skills by: (date) _______  Plan for post-discharge education or self-management support:    [] Outpatient class schedule provided [] Patient Declined    [] Scheduled for outpatient classes (date) _______  Patient currently drowsy. Will follow-up with education when appropriate.    Tyler Rodriguez RN CDE  [facility] [phone]  Ext [phone]

## 2018-08-27 NOTE — ROUTINE PROCESS
2334 -   Assumed care of patient at [phone] report received from [facility]-MATTHEW MCALLISTER. Patient in contact Isolation for bedbugs. He is alert & oriented, denies nausea & pain. Abdomen obese & distended. Skin has generalized rash & scabs from scratching. Uses urinal with orange output. Call bell within reach. All safety precautions in place - safety maintained. 0300 - Patient resting quietly and no acute distress noted. 0600 - gown changed. Urinal within reach. 0720 - Bedside and Verbal shift change report given to Nikolay Nolasco RN (oncoming nurse) by John Moon RN BSN (offgoing nurse). Report given with SBAR, Kardex, Intake/Output, MAR and Recent Results.

## 2018-08-27 NOTE — PROGRESS NOTES
Assumed care of patient, Patient tin bed resting. Patient is current on isolation for bedbugs. No c/o pain at this time. Sbar report received from [facility] . [phone]  Bedside / verbal shift change report given to Roberto Barone   (oncoming nurse) by Atul Kang RN (offgoing nurse). Report included the following information SBAR, Kardex, Intake/Output, MAR and Recent Results.

## 2018-08-27 NOTE — PROGRESS NOTES
General Surgery Consult      Cali Johnson  Admit date: 2018    MRN: [id number]     : 1960     Age: 62 y.o. Attending Physician: Kim Nevarez MD, FACS      Subjective:     Nicholas Bauman is a 62 y.o. male who is being admitted with gallstone pancreatitis. The patient presented with abdominal pain as well as back pain. The pain started 3 days ago. He has had nausea and vomiting. The patient  has not had jaundice, acholic stools or dark urine and has had a history of pancreatitis. Findings of ultrasound of the abdomen: gallstones. He is feeling better but still complaining of abdominal pain and nausea.     Patient Active Problem List    Diagnosis Date Noted    Calculus of gallbladder without cholecystitis 2018    Morbid obesity ([facility] 2018    Pancreatitis 2018    Moderate single current episode of major depressive disorder ([facility] 12/15/2016    Essential hypertension with goal blood pressure less than 140/90 2016    Microalbuminuria due to type 2 diabetes mellitus ([facility] 2016    Coronary artery disease involving coronary bypass graft of native heart without angina pectoris 2016    Type II diabetes mellitus, uncontrolled ([facility] 2016    Vitamin D deficiency 2016     Past Medical History:   Diagnosis Date    Coronary artery disease     Depression     DM (diabetes mellitus screen)     Heart disease     Hyperlipidemia     Kidney calculi       Past Surgical History:   Procedure Laterality Date    HX OTHER SURGICAL      pt stated he had a triple bypass    HX OTHER SURGICAL      excision of right benign adrenal mass       Social History   Substance Use Topics    Smoking status: Current Every Day Smoker     Packs/day: 0.25    Smokeless tobacco: Never Used      Comment: 1 cigarette a day     Alcohol use No      Comment: Former smoker      History   Smoking Status    Current Every Day Smoker    Packs/day: 0.25   Smokeless Tobacco    Never Used Comment: 1 cigarette a day      History reviewed. No pertinent family history.    Current Facility-Administered Medications   Medication Dose Route Frequency    morphine injection 1 mg  1 mg IntraVENous Q4H PRN    insulin lispro (HUMALOG) injection   SubCUTAneous Q6H    insulin glargine (LANTUS) injection 10 Units  10 Units SubCUTAneous Q12H    FLUoxetine (PROzac) capsule 20 mg  20 mg Oral DAILY    lisinopril (PRINIVIL, ZESTRIL) tablet 40 mg  40 mg Oral DAILY    metoprolol tartrate (LOPRESSOR) tablet 50 mg  50 mg Oral BID    dextrose 5% - 0.45% NaCl with KCl 20 mEq/L infusion  100 mL/hr IntraVENous CONTINUOUS    glucose chewable tablet 16 g  4 Tab Oral PRN    glucagon (GLUCAGEN) injection 1 mg  1 mg IntraMUSCular PRN    dextrose (D50W) injection syrg 12.5-25 g  25-50 mL IntraVENous PRN      No Known Allergies     Review of Systems:  Constitutional: negative  Eyes: negative  Ears, Nose, Mouth, Throat, and Face: negative  Respiratory: negative  Cardiovascular: negative  Gastrointestinal: positive for nausea and abdominal pain  Genitourinary:negative  Integument/Breast: negative  Hematologic/Lymphatic: negative  Musculoskeletal:negative  Neurological: negative  Behavioral/Psychiatric: negative  Endocrine: negative  Allergic/Immunologic: negative    Objective:     Visit Vitals    /79 (BP 1 Location: Left arm, BP Patient Position: At rest)    Pulse 94    Temp 98.3 °F (36.8 °C)    Resp 20    Ht 6' 1\" (1.854 m)    Wt 145.6 kg (321 lb)    SpO2 91%    BMI 42.35 kg/m2       Physical Exam:      General:  in no apparent distress, alert, oriented times 3, afebrile and anicteric   Eyes:  conjunctivae and sclerae normal, pupils equal, round, reactive to light   Throat & Neck: no erythema or exudates noted and neck supple and symmetrical; no palpable masses   Lungs:   clear to auscultation bilaterally   Heart:  Regular rate and rhythm   Abdomen:   obese and protuberant, soft, nontender except for epigastric tendreness, nondistended, no masses or organomegaly. Extremities: extremities normal, atraumatic, no cyanosis or edema   Skin: Normal.         Imaging and Lab Review:     CBC:   Lab Results   Component Value Date/Time    WBC 10.0 08/26/2018 07:55 AM    RBC 4.46 (L) 08/26/2018 07:55 AM    HGB 13.4 08/26/2018 07:55 AM    HCT 41.3 08/26/2018 07:55 AM    PLATELET 854 28/60/1272 07:55 AM     BMP:   Lab Results   Component Value Date/Time    Glucose 265 (H) 08/26/2018 07:55 AM    Sodium 138 08/26/2018 07:55 AM    Potassium 4.4 08/26/2018 07:55 AM    Chloride 103 08/26/2018 07:55 AM    CO2 30 08/26/2018 07:55 AM    BUN 14 08/26/2018 07:55 AM    Creatinine 1.06 08/26/2018 07:55 AM    Calcium 8.4 (L) 08/26/2018 07:55 AM     CMP:  Lab Results   Component Value Date/Time    Glucose 265 (H) 08/26/2018 07:55 AM    Sodium 138 08/26/2018 07:55 AM    Potassium 4.4 08/26/2018 07:55 AM    Chloride 103 08/26/2018 07:55 AM    CO2 30 08/26/2018 07:55 AM    BUN 14 08/26/2018 07:55 AM    Creatinine 1.06 08/26/2018 07:55 AM    Calcium 8.4 (L) 08/26/2018 07:55 AM    Anion gap 5 08/26/2018 07:55 AM    BUN/Creatinine ratio 13 08/26/2018 07:55 AM    Alk. phosphatase 160 (H) 08/25/2018 03:40 PM    Protein, total 7.2 08/25/2018 03:40 PM    Albumin 3.4 08/25/2018 03:40 PM    Globulin 3.8 08/25/2018 03:40 PM    A-G Ratio 0.9 08/25/2018 03:40 PM       Recent Results (from the past 24 hour(s))   CBC WITH AUTOMATED DIFF    Collection Time: 08/26/18  7:55 AM   Result Value Ref Range    WBC 10.0 4.6 - 13.2 K/uL    RBC 4.46 (L) 4.70 - 5.50 M/uL    HGB 13.4 13.0 - 16.0 g/dL    HCT 41.3 36.0 - 48.0 %    MCV 92.6 74.0 - 97.0 FL    MCH 30.0 24.0 - 34.0 PG    MCHC 32.4 31.0 - 37.0 g/dL    RDW 14.9 (H) 11.6 - 14.5 %    PLATELET 953 311 - 157 K/uL    MPV 8.9 (L) 9.2 - 11.8 FL    NEUTROPHILS 82 (H) 40 - 73 %    LYMPHOCYTES 11 (L) 21 - 52 %    MONOCYTES 7 3 - 10 %    EOSINOPHILS 0 0 - 5 %    BASOPHILS 0 0 - 2 %    ABS.  NEUTROPHILS 8.2 (H) 1.8 - 8.0 K/UL ABS. LYMPHOCYTES 1.1 0.9 - 3.6 K/UL    ABS. MONOCYTES 0.7 0.05 - 1.2 K/UL    ABS. EOSINOPHILS 0.0 0.0 - 0.4 K/UL    ABS. BASOPHILS 0.0 0.0 - 0.1 K/UL    DF AUTOMATED     METABOLIC PANEL, BASIC    Collection Time: 08/26/18  7:55 AM   Result Value Ref Range    Sodium 138 136 - 145 mmol/L    Potassium 4.4 3.5 - 5.5 mmol/L    Chloride 103 100 - 108 mmol/L    CO2 30 21 - 32 mmol/L    Anion gap 5 3.0 - 18 mmol/L    Glucose 265 (H) 74 - 99 mg/dL    BUN 14 7.0 - 18 MG/DL    Creatinine 1.06 0.6 - 1.3 MG/DL    BUN/Creatinine ratio 13 12 - 20      GFR est AA >60 >60 ml/min/1.73m2    GFR est non-AA >60 >60 ml/min/1.73m2    Calcium 8.4 (L) 8.5 - 10.1 MG/DL   LIPASE    Collection Time: 08/26/18  7:55 AM   Result Value Ref Range    Lipase 1082 (H) 73 - 393 U/L   GLUCOSE, POC    Collection Time: 08/26/18 12:09 PM   Result Value Ref Range    Glucose (POC) 278 (H) 70 - 110 mg/dL   GLUCOSE, POC    Collection Time: 08/26/18  6:39 PM   Result Value Ref Range    Glucose (POC) 256 (H) 70 - 110 mg/dL   GLUCOSE, POC    Collection Time: 08/26/18 11:41 PM   Result Value Ref Range    Glucose (POC) 227 (H) 70 - 110 mg/dL       images and reports reviewed    Assessment:   Lissett Nina is a 62 y.o. male is presenting with abdominal pain and a picture of gallstone pancreatitis. I explained the indications for robotic cholecystectomy as well as the alternatives. I discussed the potential risks, including but not limited to bleeding, wound infection, trocar injuries, bile duct injury and leak, and also the possible need for conversion to open procedure. I also explained the firefly technology and how it allow us to visualize the biliary tree to avoid bile duct injury or leak. He indicates that he understands the risks, accepts and wishes to proceed.     Plan:     NPO except for sips and chips  IV fluids  Pain mangement  Follow up on labs  If continue to improve clinically, than will schedule for robotic cholecystectomy with firefly (ICG) technology for identification of the biliary tree.      Please call me if you have any questions (cell phone: [phone])     Signed By: Pablo Thomson MD     August 27, 2018

## 2018-08-27 NOTE — DIABETES MGMT
GLYCEMIC CONTROL AND NUTRITION    Assessment/Recommendations:  Blood glucose this am 214 mg/dl  Lantus dose increased to 14 units every 12 hours  Continue corrective insulin coverage as needed  Patient already receiving very insulin resistant dosing  Most recent blood glucose values:    Current A1C of 7.9 % is equivalent to average blood glucose of 180 mg/dl over the past 2-3 months.     Current hospital diabetes medications:   Lantus 10 units every 12 hours  Lispro corrective insulin coverage AC&HS  Previous day's insulin requirements:   lantus 20 units  Lispro 24 units corrective insulin  Home diabetes medications:  Lantus 70 units daily  Metformin 1000 mg twice a day  Diet:    NPO with meds  Education:  __x_Refer to Diabetes Education Record             ____Education not indicated at this time      Shannan Molina, [facility],[address]  Pager [phone]

## 2018-08-27 NOTE — ANESTHESIA PREPROCEDURE EVALUATION
Anesthetic History   No history of anesthetic complications            Review of Systems / Medical History  Patient summary reviewed and pertinent labs reviewed    Pulmonary  Within defined limits        Undiagnosed apnea         Neuro/Psych   Within defined limits           Cardiovascular    Hypertension          CAD    Exercise tolerance: >4 METS     GI/Hepatic/Renal  Within defined limits              Endo/Other    Diabetes    Morbid obesity     Other Findings   Comments: Documentation of current medication  Current medications obtained, documented and obtained? YES      Risk Factors for Postoperative nausea/vomiting:       History of postoperative nausea/vomiting? NO       Female? NO       Motion sickness? NO       Intended opioid administration for postoperative analgesia? YES      Smoking Abstinence:  Current Smoker? YES  Elective Surgery? YES  Seen preoperatively by anesthesiologist or proxy prior to day of surgery? YES  Pt abstained from smoking 24 hours prior to anesthesia?  YES    Preventive care/screening for High Blood Pressure:  Aged 18 years and older: YES  Screened for high blood pressure: YES  Patients with high blood pressure referred to primary care provider   for BP management: YES               Physical Exam    Airway  Mallampati: III  TM Distance: 4 - 6 cm  Neck ROM: normal range of motion   Mouth opening: Diminished (comment)     Cardiovascular  Regular rate and rhythm,  S1 and S2 normal,  no murmur, click, rub, or gallop  Rhythm: regular  Rate: normal         Dental    Dentition: Poor dentition     Pulmonary  Breath sounds clear to auscultation               Abdominal  GI exam deferred       Other Findings            Anesthetic Plan    ASA: 3  Anesthesia type: general          Induction: Intravenous  Anesthetic plan and risks discussed with: Patient

## 2018-08-27 NOTE — INTERDISCIPLINARY ROUNDS
Patient in isolation. Reviewed for IDR- possible lap chary tomorrow. Discharge plan: return to group home. CM to continue to follow. SAFIA Quick-BC.

## 2018-08-27 NOTE — PROGRESS NOTES
Patient received in bed resting; easily awaken. Continues to be on Contact Isolation for bedbugs. Patient A&Ox4, denies pain and discomfort. No distress noted. Frequently use items within reach. Bed locked in low position. Call bell within reach and Patient verbalized understanding of use for assistance and needs.

## 2018-08-27 NOTE — PROGRESS NOTES
[address] [place], [place] [address]ty [facility]  Hospitalist Division           Inpatient Daily Progress Note        Patient: Brandon Juan MRN: [id number]  CSN: [id number]    YOB: 1960  Age: 62 y.o. Sex: male    DOA: 8/25/2018 LOS:  LOS: 2 days                    Chief Complaint:  Cholelithiasis     Interval History:      Presented to ED with back/abd pain w/ associated v/d. In ER, dx with pancreatitis, elevated bilirubin and LFTs. NPO, IVFs, pain management, antiemetics. US c/w cholelithiasis; general surgery consulted. Lipase improved. 8/27/18: Continues with abd pain. Continue as above. If continues to improve, General Sx to complete robotic cholecystectomy with firefly technology for identification of biliary tree. Subjective:      No acute events overnight. No emesis per patient. Objective:      Visit Vitals    /81 (BP 1 Location: Right arm, BP Patient Position: At rest)    Pulse 96    Temp 98.5 °F (36.9 °C)    Resp 18    Ht 6' 1\" (1.854 m)    Wt 145.6 kg (321 lb)    SpO2 92%    BMI 42.35 kg/m2           Physical Exam:  General appearance: alert, cooperative, no distress  Lungs: clear to auscultation bilaterally  Heart: regular rate and rhythm, S1, S2 normal  Abdomen: soft, TTP epigastric, LUQ, non distended. Normoactive bowel sounds.    Extremities: extremities normal, atraumatic, no cyanosis or edema  Skin: Skin color, texture, turgor normal.   Neurologic: Grossly normal  PSY: Mood and affect normal, appropriately behaved      Intake and Output:  Current Shift:  08/27 0701 - 08/27 1900  In: -   Out: 25 [Urine:25]  Last three shifts:  08/25 1901 - 08/27 0700  In: 1535 [I.V.:1535]  Out: 970 [Urine:970]    Recent Results (from the past 24 hour(s))   GLUCOSE, POC    Collection Time: 08/26/18 12:09 PM   Result Value Ref Range    Glucose (POC) 278 (H) 70 - 110 mg/dL   GLUCOSE, POC    Collection Time: 08/26/18  6:39 PM   Result Value Ref Range    Glucose (POC) 256 (H) 70 - 110 mg/dL   GLUCOSE, POC    Collection Time: 08/26/18 11:41 PM   Result Value Ref Range    Glucose (POC) 227 (H) 70 - 110 mg/dL   GLUCOSE, POC    Collection Time: 08/27/18  6:06 AM   Result Value Ref Range    Glucose (POC) 214 (H) 70 - 110 mg/dL           Lab Results   Component Value Date/Time    Glucose 265 (H) 08/26/2018 07:55 AM    Glucose 320 (H) 08/25/2018 03:40 PM    Glucose 234 (H) 06/05/2017 01:41 PM    Glucose 321 (H) 03/02/2017 11:17 AM    Glucose 162 (H) 12/15/2016 02:41 PM        Assessment/Plan:     Patient Active Problem List   Diagnosis Code    Coronary artery disease involving coronary bypass graft of native heart without angina pectoris I25.810    Type II diabetes mellitus, uncontrolled ([facility]) E11.65    Vitamin D deficiency E55.9    Essential hypertension with goal blood pressure less than 140/90 I10    Microalbuminuria due to type 2 diabetes mellitus ([facility] E11.29, R80.9    Moderate single current episode of major depressive disorder ([facility] F32.1    Pancreatitis K85.90    Calculus of gallbladder without cholecystitis K80.20    Morbid obesity ([facility]) E66.01       A/P:    Cholelithiasis  -General surgery following-appreciate (to complete robotic cholecystectomy with firefly technology tomorrow?)   -NPO, ice chips  -pain meds prn  -antiemetics prn   -monitor lipase     DM II  -accuchecks  -a1c 7.9   -SSI  -lantus     HTN  -monitor BP  -home meds     H/o CAD  -acei, bb   -will hold asa until surgical procedure completed    Morbid obesity  -educate diet       Bobetta Hamman, Lucille Mince, NP-C [facility]  [facility]:[phone]  Office:  [phone]

## 2018-08-27 NOTE — DIABETES MGMT
NUTRITIONAL ASSESSMENT GLYCEMIC CONTROL/ PLAN OF CARE     Zoltan Paz           62 y.o.           8/25/2018                 1. Acute pancreatitis, unspecified complication status, unspecified pancreatitis type    2. Uncontrolled type 2 diabetes mellitus with microalbuminuria, with long-term current use of insulin ([facility])       INTERVENTIONS/PLAN:   1. Suggest increasing Lantus to 14 units every 12 hours. 2.  Monitor diet advancement/appetite status, labs, glycemic response to insulin adjustments and weights. ASSESSMENT:   Nutritional Status:  Pt is 1 74% ideal weight. Pt appears well nourished. Pt appears to be having discomfort but did not voice complaints. Pt not appropriate for diabetes education at this time. Nutrition Diagnoses:   Inadequate oral food and beverage intake due to gallstone pancreatitis with pending surgery 8/29/18 as evidenced by NPO orders. Morbid obesity due to excess energy intake as evidenced by BMI (calculated): 42.4 kg/m2. Altered nutrition related labs due to T2DM as evidenced by A1C of 7.9%. Altered GI function due to gallstone pancreatitis as evidenced by lipase of 4383. Diabetes Management:   Pt with elevated BG and would benefit from small increase of basal insulin. Recent blood glucose:     8/27/18:  214, 224  8/26/18:  278, 256, 227 - pt received TDD  Insulin = 44 units (20 units Lantus and 24 units corrective lispro)  Within target range (non-ICU: <140; ICU<180): [] Yes   [x]  No    Current Insulin regimen:   Lantus 10 units every 12 hours  Corrective lispro, v mohan insulin resistant dosing every 6 hours  Home medication/insulin regimen: Lantus 70 units/day  Metformin 1000 mg BID  HbA1c: 7.9% - ave BG has been ~ 177 mg/dL over past 3 months. Adequate glycemic control PTA:  [] Yes  [x] No       SUBJECTIVE/OBJECTIVE:   Information obtained from:  Chart review, pt  Pt admitted with 3 days of N/V and gallstone pancreatitis.   PMHx includes T2DM, HTN, CAD with CABG.  8/28/18:  Pt reports his usual weight is 325 lbs. He does not have any known food allergies. Diet: NPO with sips of water    No data found. Medications: [x]                Reviewed   IVF:  D5 1/2 NS with 2 0 mEq KCl/L at 100 ml/hr (408 calories/day)    Most Recent POC Glucose:   Recent Labs      08/26/18   0755  08/25/18   1540   GLU  265*  320*         Labs:   Lab Results   Component Value Date/Time    Hemoglobin A1c 7.9 (H) 08/25/2018 03:40 PM     Lab Results   Component Value Date/Time    Sodium 138 08/26/2018 07:55 AM    Potassium 4.4 08/26/2018 07:55 AM    Chloride 103 08/26/2018 07:55 AM    CO2 30 08/26/2018 07:55 AM    Anion gap 5 08/26/2018 07:55 AM    Glucose 265 (H) 08/26/2018 07:55 AM    BUN 14 08/26/2018 07:55 AM    Creatinine 1.06 08/26/2018 07:55 AM    Calcium 8.4 (L) 08/26/2018 07:55 AM    Albumin 3.4 08/25/2018 03:40 PM     Lab Results   Component Value Date/Time    Cholesterol, total 118 06/05/2017 01:41 PM    HDL Cholesterol 24 (L) 06/05/2017 01:41 PM    LDL, calculated  06/05/2017 01:41 PM     LDL AND VLDL CHOLESTEROL NOT CALCULATED WHEN TRIGLYCERIDES >400 MG/DL OR HDL CHOLESTEROL <20 MG/DL    VLDL, calculated  06/05/2017 01:41 PM     Calculation not valid with this patient's other Lipid values. Triglyceride 544 (H) 06/05/2017 01:41 PM    CHOL/HDL Ratio 4.9 06/05/2017 01:41 PM     Anthropometrics: IBW : 83.6 kg (184 lb 4.9 oz), % IBW (Calculated): 174.17 %, BMI (calculated): 42.4  Wt Readings from Last 1 Encounters:   08/25/18 145.6 kg (321 lb)      Ht Readings from Last 1 Encounters:   08/25/18 6' 1\" (1.854 m)       Estimated Nutrition Needs:  2038 Kcals/day, Protein (g): 125 g Fluid (ml): 2000 ml  Based on:   [x]          Actual BW    []          ABW   []            Adjusted BW         Nutrition Interventions:  None at this time. Goal:   Blood glucose will be within target range of  mg/dL by 8/30/18. Pt will consume > 75% meals by 9/1/18.         Nutrition Monitoring and Evaluation      []     Monitor po intake on meal rounds  [x]     Continue inpatient monitoring and intervention  []     Other:      Nutrition Risk:  []   High     [x]  Moderate    []  Minimal/Uncompromised    Hayes Santana RD, CDE   Office:  [address] [place] [address] Pager:  [phone]

## 2018-08-28 ENCOUNTER — ANESTHESIA (OUTPATIENT)
Dept: SURGERY | Age: 58
DRG: 335 | End: 2018-08-28
Payer: OTHER GOVERNMENT

## 2018-08-28 LAB
ALBUMIN SERPL-MCNC: 2 G/DL (ref 3.4–5)
ALBUMIN/GLOB SERPL: 0.6 {RATIO} (ref 0.8–1.7)
ALP SERPL-CCNC: 155 U/L (ref 45–117)
ALT SERPL-CCNC: 210 U/L (ref 16–61)
ANION GAP SERPL CALC-SCNC: 7 MMOL/L (ref 3–18)
AST SERPL-CCNC: 67 U/L (ref 15–37)
BASOPHILS # BLD: 0 K/UL (ref 0–0.1)
BASOPHILS NFR BLD: 0 % (ref 0–2)
BILIRUB SERPL-MCNC: 1.3 MG/DL (ref 0.2–1)
BUN SERPL-MCNC: 10 MG/DL (ref 7–18)
BUN/CREAT SERPL: 14 (ref 12–20)
CALCIUM SERPL-MCNC: 8.2 MG/DL (ref 8.5–10.1)
CHLORIDE SERPL-SCNC: 104 MMOL/L (ref 100–108)
CO2 SERPL-SCNC: 26 MMOL/L (ref 21–32)
CREAT SERPL-MCNC: 0.7 MG/DL (ref 0.6–1.3)
DIFFERENTIAL METHOD BLD: ABNORMAL
EOSINOPHIL # BLD: 0 K/UL (ref 0–0.4)
EOSINOPHIL NFR BLD: 1 % (ref 0–5)
ERYTHROCYTE [DISTWIDTH] IN BLOOD BY AUTOMATED COUNT: 14.6 % (ref 11.6–14.5)
GLOBULIN SER CALC-MCNC: 3.5 G/DL (ref 2–4)
GLUCOSE BLD STRIP.AUTO-MCNC: 197 MG/DL (ref 70–110)
GLUCOSE BLD STRIP.AUTO-MCNC: 230 MG/DL (ref 70–110)
GLUCOSE BLD STRIP.AUTO-MCNC: 235 MG/DL (ref 70–110)
GLUCOSE BLD STRIP.AUTO-MCNC: 244 MG/DL (ref 70–110)
GLUCOSE SERPL-MCNC: 227 MG/DL (ref 74–99)
HCT VFR BLD AUTO: 34.8 % (ref 36–48)
HGB BLD-MCNC: 11.3 G/DL (ref 13–16)
LIPASE SERPL-CCNC: 218 U/L (ref 73–393)
LYMPHOCYTES # BLD: 1.1 K/UL (ref 0.9–3.6)
LYMPHOCYTES NFR BLD: 13 % (ref 21–52)
MCH RBC QN AUTO: 29.5 PG (ref 24–34)
MCHC RBC AUTO-ENTMCNC: 32.5 G/DL (ref 31–37)
MCV RBC AUTO: 90.9 FL (ref 74–97)
MONOCYTES # BLD: 0.5 K/UL (ref 0.05–1.2)
MONOCYTES NFR BLD: 7 % (ref 3–10)
NEUTS SEG # BLD: 6.7 K/UL (ref 1.8–8)
NEUTS SEG NFR BLD: 79 % (ref 40–73)
PLATELET # BLD AUTO: 211 K/UL (ref 135–420)
PMV BLD AUTO: 8.5 FL (ref 9.2–11.8)
POTASSIUM SERPL-SCNC: 3.9 MMOL/L (ref 3.5–5.5)
PROT SERPL-MCNC: 5.5 G/DL (ref 6.4–8.2)
RBC # BLD AUTO: 3.83 M/UL (ref 4.7–5.5)
SODIUM SERPL-SCNC: 137 MMOL/L (ref 136–145)
WBC # BLD AUTO: 8.4 K/UL (ref 4.6–13.2)

## 2018-08-28 PROCEDURE — 74011000250 HC RX REV CODE- 250: Performed by: SURGERY

## 2018-08-28 PROCEDURE — 77030035277 HC OBTRTR BLDELSS DISP INTU -B: Performed by: SURGERY

## 2018-08-28 PROCEDURE — 77030011267 HC ELECTRD BLD COVD -A: Performed by: SURGERY

## 2018-08-28 PROCEDURE — 74011000250 HC RX REV CODE- 250

## 2018-08-28 PROCEDURE — 74011250636 HC RX REV CODE- 250/636: Performed by: SURGERY

## 2018-08-28 PROCEDURE — 77030035048 HC TRCR ENDOSC OPTCL COVD -B: Performed by: SURGERY

## 2018-08-28 PROCEDURE — 76060000033 HC ANESTHESIA 1 TO 1.5 HR: Performed by: SURGERY

## 2018-08-28 PROCEDURE — 74011636637 HC RX REV CODE- 636/637: Performed by: HOSPITALIST

## 2018-08-28 PROCEDURE — 36415 COLL VENOUS BLD VENIPUNCTURE: CPT | Performed by: SURGERY

## 2018-08-28 PROCEDURE — 77030018842 HC SOL IRR SOD CL 9% BAXT -A: Performed by: SURGERY

## 2018-08-28 PROCEDURE — 85025 COMPLETE CBC W/AUTO DIFF WBC: CPT | Performed by: SURGERY

## 2018-08-28 PROCEDURE — 82962 GLUCOSE BLOOD TEST: CPT

## 2018-08-28 PROCEDURE — 65270000029 HC RM PRIVATE

## 2018-08-28 PROCEDURE — 74011250637 HC RX REV CODE- 250/637: Performed by: HOSPITALIST

## 2018-08-28 PROCEDURE — 77030002933 HC SUT MCRYL J&J -A: Performed by: SURGERY

## 2018-08-28 PROCEDURE — 74011250636 HC RX REV CODE- 250/636

## 2018-08-28 PROCEDURE — 83690 ASSAY OF LIPASE: CPT | Performed by: SURGERY

## 2018-08-28 PROCEDURE — 77030010507 HC ADH SKN DERMBND J&J -B: Performed by: SURGERY

## 2018-08-28 PROCEDURE — 74011250636 HC RX REV CODE- 250/636: Performed by: HOSPITALIST

## 2018-08-28 PROCEDURE — 77030020703 HC SEAL CANN DISP INTU -B: Performed by: SURGERY

## 2018-08-28 PROCEDURE — 0FN04ZZ RELEASE LIVER, PERCUTANEOUS ENDOSCOPIC APPROACH: ICD-10-PCS | Performed by: SURGERY

## 2018-08-28 PROCEDURE — 74011250637 HC RX REV CODE- 250/637: Performed by: NURSE ANESTHETIST, CERTIFIED REGISTERED

## 2018-08-28 PROCEDURE — 76010000934 HC OR TIME 1 TO 1.5HR INTENSV - TIER 2: Performed by: SURGERY

## 2018-08-28 PROCEDURE — 77030009852 HC PCH RTVR ENDOSC COVD -B: Performed by: SURGERY

## 2018-08-28 PROCEDURE — 77030032490 HC SLV COMPR SCD KNE COVD -B: Performed by: SURGERY

## 2018-08-28 PROCEDURE — 77030033269 HC SLV COMPR SCD KNE2 CARD -B

## 2018-08-28 PROCEDURE — 8E0W4CZ ROBOTIC ASSISTED PROCEDURE OF TRUNK REGION, PERCUTANEOUS ENDOSCOPIC APPROACH: ICD-10-PCS | Performed by: SURGERY

## 2018-08-28 PROCEDURE — 80053 COMPREHEN METABOLIC PANEL: CPT | Performed by: SURGERY

## 2018-08-28 PROCEDURE — 76210000006 HC OR PH I REC 0.5 TO 1 HR: Performed by: SURGERY

## 2018-08-28 PROCEDURE — 77030010939 HC CLP LIG TELE -B: Performed by: SURGERY

## 2018-08-28 PROCEDURE — 77030035045 HC TRCR ENDOSC VRSPRT BLDLSS COVD -B: Performed by: SURGERY

## 2018-08-28 RX ORDER — ONDANSETRON 2 MG/ML
4 INJECTION INTRAMUSCULAR; INTRAVENOUS
Status: DISCONTINUED | OUTPATIENT
Start: 2018-08-28 | End: 2018-08-29 | Stop reason: HOSPADM

## 2018-08-28 RX ORDER — INSULIN GLARGINE 100 [IU]/ML
6 INJECTION, SOLUTION SUBCUTANEOUS ONCE
Status: COMPLETED | OUTPATIENT
Start: 2018-08-28 | End: 2018-08-28

## 2018-08-28 RX ORDER — PROPOFOL 10 MG/ML
INJECTION, EMULSION INTRAVENOUS AS NEEDED
Status: DISCONTINUED | OUTPATIENT
Start: 2018-08-28 | End: 2018-08-28 | Stop reason: HOSPADM

## 2018-08-28 RX ORDER — DIPHENHYDRAMINE HYDROCHLORIDE 50 MG/ML
25 INJECTION, SOLUTION INTRAMUSCULAR; INTRAVENOUS
Status: DISCONTINUED | OUTPATIENT
Start: 2018-08-28 | End: 2018-08-29 | Stop reason: HOSPADM

## 2018-08-28 RX ORDER — MAGNESIUM SULFATE 100 %
4 CRYSTALS MISCELLANEOUS AS NEEDED
Status: DISCONTINUED | OUTPATIENT
Start: 2018-08-28 | End: 2018-08-29 | Stop reason: HOSPADM

## 2018-08-28 RX ORDER — OXYCODONE AND ACETAMINOPHEN 5; 325 MG/1; MG/1
1 TABLET ORAL AS NEEDED
Status: DISCONTINUED | OUTPATIENT
Start: 2018-08-28 | End: 2018-08-29 | Stop reason: HOSPADM

## 2018-08-28 RX ORDER — FAMOTIDINE 20 MG/1
20 TABLET, FILM COATED ORAL ONCE
Status: COMPLETED | OUTPATIENT
Start: 2018-08-28 | End: 2018-08-28

## 2018-08-28 RX ORDER — FENTANYL CITRATE 50 UG/ML
INJECTION, SOLUTION INTRAMUSCULAR; INTRAVENOUS AS NEEDED
Status: DISCONTINUED | OUTPATIENT
Start: 2018-08-28 | End: 2018-08-28 | Stop reason: HOSPADM

## 2018-08-28 RX ORDER — NEOSTIGMINE METHYLSULFATE 5 MG/5 ML
SYRINGE (ML) INTRAVENOUS AS NEEDED
Status: DISCONTINUED | OUTPATIENT
Start: 2018-08-28 | End: 2018-08-28 | Stop reason: HOSPADM

## 2018-08-28 RX ORDER — LIDOCAINE HYDROCHLORIDE 20 MG/ML
INJECTION, SOLUTION EPIDURAL; INFILTRATION; INTRACAUDAL; PERINEURAL AS NEEDED
Status: DISCONTINUED | OUTPATIENT
Start: 2018-08-28 | End: 2018-08-28 | Stop reason: HOSPADM

## 2018-08-28 RX ORDER — FENTANYL CITRATE 50 UG/ML
25 INJECTION, SOLUTION INTRAMUSCULAR; INTRAVENOUS
Status: DISCONTINUED | OUTPATIENT
Start: 2018-08-28 | End: 2018-08-29 | Stop reason: HOSPADM

## 2018-08-28 RX ORDER — VECURONIUM BROMIDE FOR INJECTION 1 MG/ML
INJECTION, POWDER, LYOPHILIZED, FOR SOLUTION INTRAVENOUS AS NEEDED
Status: DISCONTINUED | OUTPATIENT
Start: 2018-08-28 | End: 2018-08-28 | Stop reason: HOSPADM

## 2018-08-28 RX ORDER — INSULIN LISPRO 100 [IU]/ML
INJECTION, SOLUTION INTRAVENOUS; SUBCUTANEOUS ONCE
Status: ACTIVE | OUTPATIENT
Start: 2018-08-28 | End: 2018-08-29

## 2018-08-28 RX ORDER — GLYCOPYRROLATE 0.2 MG/ML
INJECTION INTRAMUSCULAR; INTRAVENOUS AS NEEDED
Status: DISCONTINUED | OUTPATIENT
Start: 2018-08-28 | End: 2018-08-28 | Stop reason: HOSPADM

## 2018-08-28 RX ORDER — DEXTROSE 50 % IN WATER (D50W) INTRAVENOUS SYRINGE
25-50 AS NEEDED
Status: DISCONTINUED | OUTPATIENT
Start: 2018-08-28 | End: 2018-08-29 | Stop reason: HOSPADM

## 2018-08-28 RX ORDER — SODIUM CHLORIDE, SODIUM LACTATE, POTASSIUM CHLORIDE, CALCIUM CHLORIDE 600; 310; 30; 20 MG/100ML; MG/100ML; MG/100ML; MG/100ML
50 INJECTION, SOLUTION INTRAVENOUS CONTINUOUS
Status: DISCONTINUED | OUTPATIENT
Start: 2018-08-28 | End: 2018-08-29 | Stop reason: HOSPADM

## 2018-08-28 RX ORDER — SODIUM CHLORIDE 0.9 % (FLUSH) 0.9 %
5-10 SYRINGE (ML) INJECTION AS NEEDED
Status: DISCONTINUED | OUTPATIENT
Start: 2018-08-28 | End: 2018-08-29 | Stop reason: HOSPADM

## 2018-08-28 RX ORDER — MIDAZOLAM HYDROCHLORIDE 1 MG/ML
INJECTION, SOLUTION INTRAMUSCULAR; INTRAVENOUS AS NEEDED
Status: DISCONTINUED | OUTPATIENT
Start: 2018-08-28 | End: 2018-08-28 | Stop reason: HOSPADM

## 2018-08-28 RX ORDER — INSULIN GLARGINE 100 [IU]/ML
20 INJECTION, SOLUTION SUBCUTANEOUS EVERY 12 HOURS
Status: DISCONTINUED | OUTPATIENT
Start: 2018-08-28 | End: 2018-08-29

## 2018-08-28 RX ORDER — ONDANSETRON 2 MG/ML
INJECTION INTRAMUSCULAR; INTRAVENOUS AS NEEDED
Status: DISCONTINUED | OUTPATIENT
Start: 2018-08-28 | End: 2018-08-28 | Stop reason: HOSPADM

## 2018-08-28 RX ORDER — SUCCINYLCHOLINE CHLORIDE 20 MG/ML
INJECTION INTRAMUSCULAR; INTRAVENOUS AS NEEDED
Status: DISCONTINUED | OUTPATIENT
Start: 2018-08-28 | End: 2018-08-28 | Stop reason: HOSPADM

## 2018-08-28 RX ORDER — HYDROMORPHONE HYDROCHLORIDE 2 MG/ML
0.5 INJECTION, SOLUTION INTRAMUSCULAR; INTRAVENOUS; SUBCUTANEOUS
Status: DISCONTINUED | OUTPATIENT
Start: 2018-08-28 | End: 2018-08-29 | Stop reason: HOSPADM

## 2018-08-28 RX ORDER — INSULIN LISPRO 100 [IU]/ML
INJECTION, SOLUTION INTRAVENOUS; SUBCUTANEOUS ONCE
Status: ACTIVE | OUTPATIENT
Start: 2018-08-28 | End: 2018-08-28

## 2018-08-28 RX ORDER — SODIUM CHLORIDE 0.9 % (FLUSH) 0.9 %
5-10 SYRINGE (ML) INJECTION EVERY 8 HOURS
Status: DISCONTINUED | OUTPATIENT
Start: 2018-08-28 | End: 2018-08-29 | Stop reason: HOSPADM

## 2018-08-28 RX ORDER — HYDROMORPHONE HYDROCHLORIDE 1 MG/ML
0.2 INJECTION, SOLUTION INTRAMUSCULAR; INTRAVENOUS; SUBCUTANEOUS
Status: DISCONTINUED | OUTPATIENT
Start: 2018-08-28 | End: 2018-08-29 | Stop reason: HOSPADM

## 2018-08-28 RX ORDER — BUPIVACAINE HYDROCHLORIDE AND EPINEPHRINE 5; 5 MG/ML; UG/ML
INJECTION, SOLUTION EPIDURAL; INTRACAUDAL; PERINEURAL AS NEEDED
Status: DISCONTINUED | OUTPATIENT
Start: 2018-08-28 | End: 2018-08-28 | Stop reason: HOSPADM

## 2018-08-28 RX ORDER — SODIUM CHLORIDE, SODIUM LACTATE, POTASSIUM CHLORIDE, CALCIUM CHLORIDE 600; 310; 30; 20 MG/100ML; MG/100ML; MG/100ML; MG/100ML
50 INJECTION, SOLUTION INTRAVENOUS CONTINUOUS
Status: DISCONTINUED | OUTPATIENT
Start: 2018-08-29 | End: 2018-08-29 | Stop reason: HOSPADM

## 2018-08-28 RX ADMIN — VECURONIUM BROMIDE FOR INJECTION 4 MG: 1 INJECTION, POWDER, LYOPHILIZED, FOR SOLUTION INTRAVENOUS at 17:20

## 2018-08-28 RX ADMIN — INSULIN LISPRO 6 UNITS: 100 INJECTION, SOLUTION INTRAVENOUS; SUBCUTANEOUS at 00:39

## 2018-08-28 RX ADMIN — DEXTROSE MONOHYDRATE, SODIUM CHLORIDE, AND POTASSIUM CHLORIDE 100 ML/HR: 50; 4.5; 1.49 INJECTION, SOLUTION INTRAVENOUS at 10:17

## 2018-08-28 RX ADMIN — INSULIN LISPRO 6 UNITS: 100 INJECTION, SOLUTION INTRAVENOUS; SUBCUTANEOUS at 23:57

## 2018-08-28 RX ADMIN — INSULIN LISPRO 6 UNITS: 100 INJECTION, SOLUTION INTRAVENOUS; SUBCUTANEOUS at 06:48

## 2018-08-28 RX ADMIN — MIDAZOLAM HYDROCHLORIDE 2 MG: 1 INJECTION, SOLUTION INTRAMUSCULAR; INTRAVENOUS at 17:15

## 2018-08-28 RX ADMIN — PROPOFOL 50 MG: 10 INJECTION, EMULSION INTRAVENOUS at 17:43

## 2018-08-28 RX ADMIN — Medication 10 ML: at 06:50

## 2018-08-28 RX ADMIN — INSULIN GLARGINE 14 UNITS: 100 INJECTION, SOLUTION SUBCUTANEOUS at 09:14

## 2018-08-28 RX ADMIN — FENTANYL CITRATE 100 MCG: 50 INJECTION, SOLUTION INTRAMUSCULAR; INTRAVENOUS at 17:15

## 2018-08-28 RX ADMIN — MORPHINE SULFATE 1 MG: 2 INJECTION, SOLUTION INTRAMUSCULAR; INTRAVENOUS at 04:46

## 2018-08-28 RX ADMIN — GLYCOPYRROLATE 0.4 MG: 0.2 INJECTION INTRAMUSCULAR; INTRAVENOUS at 17:45

## 2018-08-28 RX ADMIN — LISINOPRIL 40 MG: 40 TABLET ORAL at 09:14

## 2018-08-28 RX ADMIN — LIDOCAINE HYDROCHLORIDE 40 MG: 20 INJECTION, SOLUTION EPIDURAL; INFILTRATION; INTRACAUDAL; PERINEURAL at 17:15

## 2018-08-28 RX ADMIN — FAMOTIDINE 20 MG: 20 TABLET ORAL at 06:48

## 2018-08-28 RX ADMIN — Medication 3 MG: at 17:45

## 2018-08-28 RX ADMIN — SUCCINYLCHOLINE CHLORIDE 100 MG: 20 INJECTION INTRAMUSCULAR; INTRAVENOUS at 17:15

## 2018-08-28 RX ADMIN — INSULIN LISPRO 6 UNITS: 100 INJECTION, SOLUTION INTRAVENOUS; SUBCUTANEOUS at 12:26

## 2018-08-28 RX ADMIN — Medication 10 ML: at 14:00

## 2018-08-28 RX ADMIN — FLUOXETINE 20 MG: 20 CAPSULE ORAL at 09:14

## 2018-08-28 RX ADMIN — PROPOFOL 50 MG: 10 INJECTION, EMULSION INTRAVENOUS at 17:40

## 2018-08-28 RX ADMIN — INSULIN GLARGINE 6 UNITS: 100 INJECTION, SOLUTION SUBCUTANEOUS at 12:26

## 2018-08-28 RX ADMIN — INSULIN LISPRO 3 UNITS: 100 INJECTION, SOLUTION INTRAVENOUS; SUBCUTANEOUS at 18:30

## 2018-08-28 RX ADMIN — INSULIN GLARGINE 20 UNITS: 100 INJECTION, SOLUTION SUBCUTANEOUS at 23:58

## 2018-08-28 RX ADMIN — PROPOFOL 150 MG: 10 INJECTION, EMULSION INTRAVENOUS at 17:15

## 2018-08-28 RX ADMIN — METOPROLOL TARTRATE 50 MG: 50 TABLET ORAL at 09:14

## 2018-08-28 RX ADMIN — ONDANSETRON 4 MG: 2 INJECTION INTRAMUSCULAR; INTRAVENOUS at 17:37

## 2018-08-28 NOTE — PERIOP NOTES
Patient transferred to [facility] 3133 In good condition report given ,per patient no family updated.

## 2018-08-28 NOTE — PROGRESS NOTES
Seda Chowdary per am PO meds, pt is NPO per order, Dr Anna Garrison said to give all am PO meds and lantus 14 units.

## 2018-08-28 NOTE — PROGRESS NOTES
Problem: Falls - Risk of 
Goal: *Absence of Falls Document Winfred Beadhiren Fall Risk and appropriate interventions in the flowsheet. Outcome: Progressing Towards Goal 
Fall Risk Interventions: 
Mobility Interventions: Communicate number of staff needed for ambulation/transfer, Patient to call before getting OOB Medication Interventions: Evaluate medications/consider consulting pharmacy, Patient to call before getting OOB, Teach patient to arise slowly Elimination Interventions: Urinal in reach, Call light in reach, Patient to call for help with toileting needs Problem: Pressure Injury - Risk of 
Goal: *Prevention of pressure injury Document Alcides Scale and appropriate interventions in the flowsheet. Outcome: Progressing Towards Goal 
Pressure Injury Interventions: 
  
 
Moisture Interventions: Apply protective barrier, creams and emollients, Absorbent underpads, Check for incontinence Q2 hours and as needed, Moisture barrier Activity Interventions: Pressure redistribution bed/mattress(bed type), PT/OT evaluation Mobility Interventions: HOB 30 degrees or less Nutrition Interventions: Document food/fluid/supplement intake Friction and Shear Interventions: Apply protective barrier, creams and emollients, HOB 30 degrees or less, Transfer aides:transfer board/Davon lift/ceiling lift

## 2018-08-28 NOTE — BRIEF OP NOTE
BRIEF OPERATIVE NOTE Date of Procedure: 8/28/2018 Preoperative Diagnosis: pancreatitis Postoperative Diagnosis: * No post-op diagnosis entered * Procedure(s): Attempt at [address] Surgeon(s) and Role: Fahad Linares MD - Primary Surgical Staff: 
Circ-1: Lucero Aguilar RN Scrub Tech-1: Ayah Cortes Surg Asst-1: Tano Han No case tracking events are documented in the log. Anesthesia: General  
Estimated Blood Loss: Minimal 
Specimens: * No specimens in log * Findings: Severe adhesions. Could not identify the gallbladder safely. Complications: None. Implants: * No implants in log *

## 2018-08-28 NOTE — PERIOP NOTES
TRANSFER - IN REPORT: 
 
Verbal report received from Annette Ayala RN (name) on Jl Saldaña  being received from 2200 (unit) for routine progression of care Report consisted of patients Situation, Background, Assessment and  
Recommendations(SBAR). Information from the following report(s) SBAR and Kardex was reviewed with the receiving nurse. Opportunity for questions and clarification was provided. Assessment completed upon patients arrival to unit and care assumed.

## 2018-08-28 NOTE — PROGRESS NOTES
Problem: Falls - Risk of  Goal: *Absence of Falls  Document Fabienne Fall Risk and appropriate interventions in the flowsheet. Outcome: Progressing Towards Goal  Fall Risk Interventions:  Mobility Interventions: Communicate number of staff needed for ambulation/transfer, Patient to call before getting OOB         Medication Interventions: Evaluate medications/consider consulting pharmacy, Patient to call before getting OOB, Teach patient to arise slowly    Elimination Interventions: Urinal in reach, Call light in reach, Patient to call for help with toileting needs             Problem: Pressure Injury - Risk of  Goal: *Prevention of pressure injury  Document Alcides Scale and appropriate interventions in the flowsheet.    Outcome: Progressing Towards Goal  Pressure Injury Interventions:       Moisture Interventions: Apply protective barrier, creams and emollients, Absorbent underpads, Check for incontinence Q2 hours and as needed, Moisture barrier    Activity Interventions: Pressure redistribution bed/mattress(bed type), PT/OT evaluation    Mobility Interventions: HOB 30 degrees or less    Nutrition Interventions: Document food/fluid/supplement intake    Friction and Shear Interventions: Apply protective barrier, creams and emollients, HOB 30 degrees or less, Transfer aides:transfer board/Davon lift/ceiling lift

## 2018-08-28 NOTE — PERIOP NOTES
TRANSFER - OUT REPORT: 
 
Verbal report given to lara fu(name) on Zolatn Paz  being transferred to 2215(unit) for routine post - op Report consisted of patients Situation, Background, Assessment and  
Recommendations(SBAR). Information from the following report(s) SBAR, OR Summary, Procedure Summary, Intake/Output and MAR was reviewed with the receiving nurse. Lines:  
Peripheral IV 08/25/18 Right Antecubital (Active) Site Assessment Clean, dry, & intact 8/28/2018  6:13 PM  
Phlebitis Assessment 0 8/28/2018  6:13 PM  
Infiltration Assessment 0 8/28/2018  6:13 PM  
Dressing Status Clean, dry, & intact 8/28/2018  6:13 PM  
Dressing Type Transparent;Tape 8/28/2018  6:13 PM  
Hub Color/Line Status Infusing 8/28/2018  6:13 PM  
Action Taken Open ports on tubing capped 8/27/2018  3:49 PM  
Alcohol Cap Used Yes 8/28/2018  4:46 AM  
  
 
Opportunity for questions and clarification was provided. Patient transported with: 
 Registered Nurse

## 2018-08-28 NOTE — PROGRESS NOTES
2030: Assumed pt care. Received pt resting in bed, no signs of distress. Pt on contact isolation. Bed on lowest position, wheels locked, call bell within reach. 8-28-18    6961: Bedside and Verbal shift change report given to Rodney Ramirez (oncoming nurse) by Bhargav Galeana   (offgoing nurse). Report included the following information SBAR, Kardex, Intake/Output, MAR and Recent Results.

## 2018-08-28 NOTE — PROGRESS NOTES
[address] [place], [place] [address]  Hospitalist Division           Inpatient Daily Progress Note        Patient: Soha Bravo MRN: [id number]  CSN: [id number]    YOB: 1960  Age: 62 y.o. Sex: male    DOA: 8/25/2018 LOS:  LOS: 3 days                    Chief Complaint:  Cholelithiasis     Interval History:      Presented to ED with back/abd pain w/ associated v/d. In ER, dx with pancreatitis, elevated bilirubin and LFTs. NPO, IVFs, pain management, antiemetics. US c/w cholelithiasis; general surgery consulted. Lipase improved. 8/27/18: Continues with abd pain. Continue as above. If continues to improve, General Sx to complete robotic cholecystectomy with firefly technology for identification of biliary tree. 8/28/18: Robotic cholecystectomy today. Subjective:      No acute events overnight. No emesis per patient. Objective:      Visit Vitals    /83 (BP 1 Location: Left arm, BP Patient Position: At rest)    Pulse 90    Temp 98.6 °F (37 °C)    Resp 20    Ht 6' 1\" (1.854 m)    Wt 145.6 kg (321 lb)    SpO2 91%    BMI 42.35 kg/m2           Physical Exam:  General appearance: alert, cooperative, no distress  Lungs: clear to auscultation bilaterally  Heart: regular rate and rhythm, S1, S2 normal  Abdomen: soft, TTP epigastric, LUQ, non distended. Normoactive bowel sounds.    Extremities: extremities normal, atraumatic, no cyanosis or edema  Skin: Skin color, texture, turgor normal.   Neurologic: Grossly normal  PSY: Mood and affect normal, appropriately behaved      Intake and Output:  Current Shift:     Last three shifts:  08/26 1901 - 08/28 0700  In: 1366.7 [I.V.:1366.7]  Out: 1445 [Urine:1445]    Recent Results (from the past 24 hour(s))   GLUCOSE, POC    Collection Time: 08/27/18 12:26 PM   Result Value Ref Range    Glucose (POC) 224 (H) 70 - 110 mg/dL   GLUCOSE, POC    Collection Time: 08/27/18  6:11 PM   Result Value Ref Range    Glucose (POC) 226 (H) 70 - 110 mg/dL GLUCOSE, POC    Collection Time: 18 11:14 PM   Result Value Ref Range    Glucose (POC) 233 (H) 70 - 110 mg/dL   GLUCOSE, POC    Collection Time: 18  6:06 AM   Result Value Ref Range    Glucose (POC) 244 (H) 70 - 110 mg/dL           Lab Results   Component Value Date/Time    Glucose 265 (H) 2018 07:55 AM    Glucose 320 (H) 2018 03:40 PM    Glucose 234 (H) 2017 01:41 PM    Glucose 321 (H) 2017 11:17 AM    Glucose 162 (H) 12/15/2016 02:41 PM        Assessment/Plan:     Patient Active Problem List   Diagnosis Code    Coronary artery disease involving coronary bypass graft of native heart without angina pectoris I25.810    Type II diabetes mellitus, uncontrolled ([facility]) E11.65    Vitamin D deficiency E55.9    Essential hypertension with goal blood pressure less than 140/90 I10    Microalbuminuria due to type 2 diabetes mellitus ([facility] E11.29, R80.9    Moderate single current episode of major depressive disorder ([facility] F32.1    Pancreatitis K85.90    Calculus of gallbladder without cholecystitis K80.20    Morbid obesity ([facility]) E66.01       A/P:    Cholelithiasis  -General surgery following-appreciate (to complete robotic cholecystectomy with firefly technology today  -NPO  -pain meds prn  -antiemetics prn   -monitor lipase ; significant improvement     DM II  -accuchecks (needs better glycemic control)-BS 200s   -a1c 7.9   -SSI  -lantus 14U     HTN  -monitor BP  -home meds     H/o CAD  -acei, bb   -will hold asa until surgical procedure completed    Morbid obesity  -educate diet       Jeannett Sharps, Heath Lennox, NP-C [facility] 83  [id number]  Office:  [phone]

## 2018-08-28 NOTE — PROGRESS NOTES
Patient seen and examined. Still complaining of abdominal pain but it is getting better. No labs back today but they were trending back to normal including lipase. Abdomen is soft with diffuse abdominal tenderness. Plan: Will proceed with robotic cholecystectomy today.

## 2018-08-28 NOTE — PROGRESS NOTES
1325 [facility] paged for change in pain med per pt request, 1mg morphine is \"not helping\", pt states. 1010 jacqui text paged by lizette, . 1020 multiple pages to Melodigram. 1100 no return call from jacqui, will offer pt morphine. 1340 chlorahexidine bath given 1345 pt ambulated to bathroom with walker. 1650 pt to OR. 
 
1700 kirk flores from OR called to say pre op verification not done, preproc checklist is complete so not sure what Bianka Somerset is referring to, Igor Greco and Oskar Jay both not sure what Bianka Somerset is referring to.  
 
Silvana Galeana report from pacu, couldn't get gall bladder out, too many adhesions, pt has trocar sites, pt has no pain, sleepy, arousable, vss, 2L in upper 90s. 
 
1900 pt back on floor, iv clotted, will let pm RN know. 1900 lopressor held for low bp, regular diet ordred, dietary called for tray. 1930 Bedside and Verbal shift change report given to Zaida (oncoming nurse) by Joaquin Costa RN 
 (offgoing nurse). Report included the following information Kardex, MAR and Recent Results.

## 2018-08-28 NOTE — ROUTINE PROCESS
Bedside and Verbal shift change report given to Jose Thompson RN (oncoming nurse) by Nithin Reid RN, BSN (offgoing nurse). Report given with SBAR, Kardex, Intake/Output, MAR and Recent Results.

## 2018-08-29 VITALS
OXYGEN SATURATION: 91 % | SYSTOLIC BLOOD PRESSURE: 138 MMHG | HEIGHT: 73 IN | WEIGHT: 315 LBS | HEART RATE: 89 BPM | TEMPERATURE: 99.5 F | RESPIRATION RATE: 22 BRPM | DIASTOLIC BLOOD PRESSURE: 81 MMHG | BODY MASS INDEX: 41.75 KG/M2

## 2018-08-29 LAB
ANION GAP SERPL CALC-SCNC: 5 MMOL/L (ref 3–18)
BUN SERPL-MCNC: 12 MG/DL (ref 7–18)
BUN/CREAT SERPL: 12 (ref 12–20)
CALCIUM SERPL-MCNC: 8.5 MG/DL (ref 8.5–10.1)
CHLORIDE SERPL-SCNC: 103 MMOL/L (ref 100–108)
CO2 SERPL-SCNC: 28 MMOL/L (ref 21–32)
CREAT SERPL-MCNC: 0.97 MG/DL (ref 0.6–1.3)
ERYTHROCYTE [DISTWIDTH] IN BLOOD BY AUTOMATED COUNT: 14.9 % (ref 11.6–14.5)
GLUCOSE BLD STRIP.AUTO-MCNC: 228 MG/DL (ref 70–110)
GLUCOSE BLD STRIP.AUTO-MCNC: 309 MG/DL (ref 70–110)
GLUCOSE SERPL-MCNC: 239 MG/DL (ref 74–99)
HCT VFR BLD AUTO: 34 % (ref 36–48)
HGB BLD-MCNC: 10.9 G/DL (ref 13–16)
MCH RBC QN AUTO: 29.5 PG (ref 24–34)
MCHC RBC AUTO-ENTMCNC: 32.1 G/DL (ref 31–37)
MCV RBC AUTO: 92.1 FL (ref 74–97)
PLATELET # BLD AUTO: 215 K/UL (ref 135–420)
PMV BLD AUTO: 8.5 FL (ref 9.2–11.8)
POTASSIUM SERPL-SCNC: 3.9 MMOL/L (ref 3.5–5.5)
RBC # BLD AUTO: 3.69 M/UL (ref 4.7–5.5)
SODIUM SERPL-SCNC: 136 MMOL/L (ref 136–145)
WBC # BLD AUTO: 8.8 K/UL (ref 4.6–13.2)

## 2018-08-29 PROCEDURE — 97162 PT EVAL MOD COMPLEX 30 MIN: CPT

## 2018-08-29 PROCEDURE — 74011250637 HC RX REV CODE- 250/637: Performed by: HOSPITALIST

## 2018-08-29 PROCEDURE — 85027 COMPLETE CBC AUTOMATED: CPT | Performed by: SURGERY

## 2018-08-29 PROCEDURE — 74011636637 HC RX REV CODE- 636/637: Performed by: HOSPITALIST

## 2018-08-29 PROCEDURE — 77030027138 HC INCENT SPIROMETER -A

## 2018-08-29 PROCEDURE — 97116 GAIT TRAINING THERAPY: CPT

## 2018-08-29 PROCEDURE — 74011250636 HC RX REV CODE- 250/636: Performed by: HOSPITALIST

## 2018-08-29 PROCEDURE — 36415 COLL VENOUS BLD VENIPUNCTURE: CPT | Performed by: SURGERY

## 2018-08-29 PROCEDURE — 82962 GLUCOSE BLOOD TEST: CPT

## 2018-08-29 PROCEDURE — 80048 BASIC METABOLIC PNL TOTAL CA: CPT | Performed by: SURGERY

## 2018-08-29 RX ORDER — INSULIN GLARGINE 100 [IU]/ML
28 INJECTION, SOLUTION SUBCUTANEOUS EVERY 12 HOURS
Status: DISCONTINUED | OUTPATIENT
Start: 2018-08-29 | End: 2018-08-29 | Stop reason: HOSPADM

## 2018-08-29 RX ADMIN — FLUOXETINE 20 MG: 20 CAPSULE ORAL at 09:27

## 2018-08-29 RX ADMIN — INSULIN LISPRO 6 UNITS: 100 INJECTION, SOLUTION INTRAVENOUS; SUBCUTANEOUS at 06:40

## 2018-08-29 RX ADMIN — INSULIN GLARGINE 20 UNITS: 100 INJECTION, SOLUTION SUBCUTANEOUS at 09:27

## 2018-08-29 RX ADMIN — Medication 10 ML: at 06:15

## 2018-08-29 RX ADMIN — INSULIN LISPRO 12 UNITS: 100 INJECTION, SOLUTION INTRAVENOUS; SUBCUTANEOUS at 13:24

## 2018-08-29 RX ADMIN — DEXTROSE MONOHYDRATE, SODIUM CHLORIDE, AND POTASSIUM CHLORIDE 100 ML/HR: 50; 4.5; 1.49 INJECTION, SOLUTION INTRAVENOUS at 04:41

## 2018-08-29 RX ADMIN — LISINOPRIL 40 MG: 40 TABLET ORAL at 09:27

## 2018-08-29 RX ADMIN — METOPROLOL TARTRATE 50 MG: 50 TABLET ORAL at 09:27

## 2018-08-29 NOTE — PROGRESS NOTES
[address] [place], [place] [address] Inpatient Daily Progress Note PatientLouella Nyhan MRN: [id number]  CSN: [id number] YOB: 1960  Age: 62 y.o. Sex: male DOA: 8/25/2018 LOS:  LOS: 4 days Chief Complaint:  Cholelithiasis Interval History:   
 
Presented to ED with back/abd pain w/ associated v/d. In ER, dx with pancreatitis, elevated bilirubin and LFTs. NPO, IVFs, pain management, antiemetics. US c/w cholelithiasis; general surgery consulted. Lipase improved. 8/27/18: Continues with abd pain. Continue as above. If continues to improve, General Sx to complete robotic cholecystectomy with firefly technology for identification of biliary tree. 8/28/18: Robotic cholecystectomy today aborted 2/2 severe adhesions (gallstone pancreatitis/not acute cholecystitis). 8/29/18: Pain better. Discharge planning. Subjective: No acute events overnight. No pain currently. Objective:  
  
Visit Vitals  /82 (BP 1 Location: Right arm, BP Patient Position: At rest)  Pulse 97  Temp 99.5 °F (37.5 °C)  Resp 24  
 Ht 6' 1\" (1.854 m)  Wt 145.6 kg (321 lb)  SpO2 94%  BMI 42.35 kg/m2 Physical Exam: 
General appearance: alert, cooperative, no distress Lungs: clear to auscultation bilaterally Heart: regular rate and rhythm, S1, S2 normal 
Abdomen: soft, diffuse tenderness, nondistended. Normoactive bowel sounds. Incision susan-intact Extremities: extremities normal, atraumatic, no cyanosis or edema Skin: Skin color, texture, turgor normal.  
Neurologic: Grossly normal 
PSY: Mood and affect normal, appropriately behaved Intake and Output: 
Current Shift:    
Last three shifts:  08/27 1901 - 08/29 0700 In: 3513.3 [P.O.:240; I.V.:3273.3] Out: 1350 [[id number]] Recent Results (from the past 24 hour(s)) GLUCOSE, POC Collection Time: 08/28/18 12:07 PM  
Result Value Ref Range Glucose (POC) 230 (H) 70 - 110 mg/dL GLUCOSE, POC Collection Time: 08/28/18  6:21 PM  
Result Value Ref Range Glucose (POC) 197 (H) 70 - 110 mg/dL GLUCOSE, POC Collection Time: 08/28/18 11:02 PM  
Result Value Ref Range Glucose (POC) 235 (H) 70 - 110 mg/dL GLUCOSE, POC Collection Time: 08/29/18  6:14 AM  
Result Value Ref Range Glucose (POC) 228 (H) 70 - 110 mg/dL CBC W/O DIFF Collection Time: 08/29/18  7:00 AM  
Result Value Ref Range WBC 8.8 4.6 - 13.2 K/uL  
 RBC 3.69 (L) 4.70 - 5.50 M/uL  
 HGB 10.9 (L) 13.0 - 16.0 g/dL HCT 34.0 (L) 36.0 - 48.0 % MCV 92.1 74.0 - 97.0 FL  
 MCH 29.5 24.0 - 34.0 PG  
 MCHC 32.1 31.0 - 37.0 g/dL  
 RDW 14.9 (H) 11.6 - 14.5 % PLATELET 400 493 - 305 K/uL MPV 8.5 (L) 9.2 - 51.0 FL  
METABOLIC PANEL, BASIC Collection Time: 08/29/18  7:00 AM  
Result Value Ref Range Sodium 136 136 - 145 mmol/L Potassium 3.9 3.5 - 5.5 mmol/L Chloride 103 100 - 108 mmol/L  
 CO2 28 21 - 32 mmol/L Anion gap 5 3.0 - 18 mmol/L Glucose 239 (H) 74 - 99 mg/dL BUN 12 7.0 - 18 MG/DL Creatinine 0.97 0.6 - 1.3 MG/DL  
 BUN/Creatinine ratio 12 12 - 20 GFR est AA >60 >60 ml/min/1.73m2 GFR est non-AA >60 >60 ml/min/1.73m2 Calcium 8.5 8.5 - 10.1 MG/DL Lab Results Component Value Date/Time Glucose 239 (H) 08/29/2018 07:00 AM  
 Glucose 227 (H) 08/28/2018 07:35 AM  
 Glucose 265 (H) 08/26/2018 07:55 AM  
 Glucose 320 (H) 08/25/2018 03:40 PM  
 Glucose 234 (H) 06/05/2017 01:41 PM  
  
 
Assessment/Plan:  
 
Patient Active Problem List  
Diagnosis Code  Coronary artery disease involving coronary bypass graft of native heart without angina pectoris I25.810  Type II diabetes mellitus, uncontrolled ([facility] E11.65  Vitamin D deficiency E55.9  Essential hypertension with goal blood pressure less than 140/90 I10  Microalbuminuria due to type 2 diabetes mellitus ([facility] E11.29, R80.9  Moderate single current episode of major depressive disorder (HCC) F32.1  Pancreatitis K85.90  Calculus of gallbladder without cholecystitis K80.20  Morbid obesity ([address] E66.01  
 
 
A/P: 
 
Cholelithiasis 
-General surgery following-appreciate  (robotic cholecystectomy with firefly technology 8/28/18 aborted 2/2 severe adhesions); observe at this point-general surgery signed off  
-pain meds prn 
-antiemetics prn  
-monitor lipase ; significant improvement DM II 
-accuchecks (needs better glycemic control)-BS 200s -a1c 7.9  
-SSI 
-lantus 20U  
 
HTN 
-monitor BP 
-home meds H/o CAD 
-acei, bb  
-will hold asa until surgical procedure completed Morbid obesity 
-educate diet DARA Call, NP-C [address] [place], [place] [address]pec[facility] Hospitalist Division UNTRP:[phone] Office:  [phone]

## 2018-08-29 NOTE — PROGRESS NOTES
attempted follow up visit with patient in room 2215. Patient unable to communicate, resting peacefully now. No family present. Chaplains will continue to follow and will provide pastoral care on an as needed/requested basis. Kelly Landa Board Certified [employer] [facility]  
[phone]

## 2018-08-29 NOTE — ANESTHESIA POSTPROCEDURE EVALUATION
Post-Anesthesia Evaluation and Assessment    Patient: Dannie Gaffney MRN: [id number]  SSN: [id number]    YOB: 1960  Age: 62 y.o. Sex: male       Cardiovascular Function/Vital Signs  Visit Vitals    /71 (BP 1 Location: Left arm, BP Patient Position: At rest)    Pulse 96    Temp 37.2 °C (98.9 °F)    Resp 20    Ht 6' 1\" (1.854 m)    Wt 145.6 kg (321 lb)    SpO2 93%    BMI 42.35 kg/m2       Patient is status post general anesthesia for Procedure(s): Attempt at [address]. Nausea/Vomiting: None    Postoperative hydration reviewed and adequate. Pain:  Pain Scale 1: Visual (08/28/18 1810)  Pain Intensity 1: 0 (08/28/18 1810)   Managed    Neurological Status:   Neuro (WDL): Exceptions to WDL (08/28/18 1810)  Neuro  Neurologic State: Drowsy (08/28/18 1810)  Orientation Level: Disoriented to time;Oriented to person;Oriented to place (08/28/18 1810)  Cognition: Follows commands (08/28/18 1810)   At baseline    Mental Status and Level of Consciousness: Arousable    Pulmonary Status:   O2 Device: Nasal cannula (08/28/18 1831)   Adequate oxygenation and airway patent    Complications related to anesthesia: None    Post-anesthesia assessment completed.  No concerns    Signed By: Elmer Perry MD     August 28, 2018

## 2018-08-29 NOTE — PROGRESS NOTES
Notified by nurse therapy states pt needs walker to go home. Order placed. Simba Sanchez to bring walker from  closet for uninsured.

## 2018-08-29 NOTE — PROGRESS NOTES
Patient seen and examined. Doing relatively well. Yesterday I had to abort the robotic cholecystectomy because of the severe adhesions. Giving the fact that the patient had gallstone pancreatitis and not acute cholecystitis, I decided not to convert to open especially that the patient is morbidly obese. At this point we will do observation and will hold on any further surgical intervention. The patient is recovering well from his current acute pancreatitis. If these attacks recur, then we will have to consider open cholecystectomy. I will sign off for now and  please call for any questions.

## 2018-08-29 NOTE — ANCILLARY DISCHARGE INSTRUCTIONS
Dr. Cory Cogan signed [address] paper work, paper work faxed to [facility].

## 2018-08-29 NOTE — DISCHARGE INSTRUCTIONS
DISCHARGE SUMMARY from Nurse    PATIENT INSTRUCTIONS:    After general anesthesia or intravenous sedation, for 24 hours or while taking prescription Narcotics:  · Limit your activities  · Do not drive and operate hazardous machinery  · Do not make important personal or business decisions  · Do  not drink alcoholic beverages  · If you have not urinated within 8 hours after discharge, please contact your surgeon on call. Report the following to your surgeon:  · Excessive pain, swelling, redness or odor of or around the surgical area  · Temperature over 100.5  · Nausea and vomiting lasting longer than 4 hours or if unable to take medications  · Any signs of decreased circulation or nerve impairment to extremity: change in color, persistent  numbness, tingling, coldness or increase pain  · Any questions    What to do at Home:  Recommended activity: Activity as tolerated, no tobacco, no alcohol, low fat, low cholesterol diabetic diet. If you experience any of the symptoms above, please follow up with a primary care physician. *  Please give a list of your current medications to your Primary Care Provider. *  Please update this list whenever your medications are discontinued, doses are      changed, or new medications (including over-the-counter products) are added. *  Please carry medication information at all times in case of emergency situations. These are general instructions for a healthy lifestyle:    No smoking/ No tobacco products/ Avoid exposure to second hand smoke  Surgeon General's Warning:  Quitting smoking now greatly reduces serious risk to your health.     Obesity, smoking, and sedentary lifestyle greatly increases your risk for illness    A healthy diet, regular physical exercise & weight monitoring are important for maintaining a healthy lifestyle    You may be retaining fluid if you have a history of heart failure or if you experience any of the following symptoms:  Weight gain of 3 pounds or more overnight or 5 pounds in a week, increased swelling in our hands or feet or shortness of breath while lying flat in bed. Please call your doctor as soon as you notice any of these symptoms; do not wait until your next office visit. Recognize signs and symptoms of STROKE:    F-face looks uneven    A-arms unable to move or move unevenly    S-speech slurred or non-existent    T-time-call 911 as soon as signs and symptoms begin-DO NOT go       Back to bed or wait to see if you get better-TIME IS BRAIN. Warning Signs of HEART ATTACK     Call 911 if you have these symptoms:   Chest discomfort. Most heart attacks involve discomfort in the center of the chest that lasts more than a few minutes, or that goes away and comes back. It can feel like uncomfortable pressure, squeezing, fullness, or pain.  Discomfort in other areas of the upper body. Symptoms can include pain or discomfort in one or both arms, the back, neck, jaw, or stomach.  Shortness of breath with or without chest discomfort.  Other signs may include breaking out in a cold sweat, nausea, or lightheadedness. Don't wait more than five minutes to call 911 - MINUTES MATTER! Fast action can save your life. Calling 911 is almost always the fastest way to get lifesaving treatment. Emergency Medical Services staff can begin treatment when they arrive -- up to an hour sooner than if someone gets to the hospital by car. The discharge information has been reviewed with the patient. The patient verbalized understanding. Discharge medications reviewed with the patient and appropriate educational materials and side effects teaching were provided. Patient armband removed and shredded.   ___________________________________________________________________________________________________________________________________

## 2018-08-29 NOTE — DISCHARGE SUMMARY
[facility]  Hospitalist Division    Discharge Summary    Patient: Cisco Oliver MRN: [id number]  CSN: [id number]    YOB: 1960  Age: 62 y.o. Sex: male    DOA: 8/25/2018 LOS:  LOS: 4 days   Discharge Date:      Admission Diagnoses: Pancreatitis  pancreatitis    Discharge Diagnoses:    Problem List as of 8/29/2018  Date Reviewed: 7/3/2017          Codes Class Noted - Resolved    * (Principal)Calculus of gallbladder without cholecystitis ICD-10-CM: K80.20  ICD-9-CM: 574.20  8/26/2018 - Present        Morbid obesity ([facility] ICD-10-CM: E66.01  ICD-9-CM: 278.01  8/26/2018 - Present        Pancreatitis ICD-10-CM: K85.90  ICD-9-CM: 175.8  8/25/2018 - Present        Moderate single current episode of major depressive disorder ([facility] ICD-10-CM: F32.1  ICD-9-CM: 296.22  12/15/2016 - Present        Essential hypertension with goal blood pressure less than 140/90 ICD-10-CM: I10  ICD-9-CM: 401.9  9/22/2016 - Present        Microalbuminuria due to type 2 diabetes mellitus ([facility] ICD-10-CM: E11.29, R80.9  ICD-9-CM: 250.00, 791.0  9/22/2016 - Present        Coronary artery disease involving coronary bypass graft of native heart without angina pectoris ICD-10-CM: I25.810  ICD-9-CM: 414.05  7/25/2016 - Present        Type II diabetes mellitus, uncontrolled ([facility] ICD-10-CM: E11.65  ICD-9-CM: 250.02  7/25/2016 - Present        Vitamin D deficiency ICD-10-CM: E55.9  ICD-9-CM: 268.9  7/25/2016 - Present              Discharge Condition: Good    Discharge To: Home    Consults: General Surgery    Hospital Course:     Presented to ED with back/abd pain w/ associated v/d. In ER, dx with pancreatitis, elevated bilirubin and LFTs. NPO, IVFs, pain management, antiemetics. US c/w cholelithiasis; general surgery consulted. Lipase improved. General surgery had essentially planned to complete robotic cholecystectomy with firefly technology for identification of biliary tree.  However, the operation was aborted due to severe adhesions; gallstone pancreatitis rather than acute cholecystitis, the surgeon decided not to convert to open especially with the patient's morbidly obese status. There is no other surgical intervention warranted at this time per general surgery. The patient claims that his pain is much better today and appears to be recovering well from his current acute pancreatitis. If these attacks recur, then open cholecystectomy will need to be considered. The patient will be discharged home today and should follow-up with his PCP/surgery outpatient in regards to his recent hospitalization. Patient to arrange. Inpatient treatment:    Pancreatitis   -General surgery following-appreciate  (robotic cholecystectomy with firefly technology 8/28/18 aborted 2/2 severe adhesions); observe at this point-general surgery signed off   -pain meds prn  -antiemetics prn   -monitor lipase ; significant improvement      DM II  -accuchecks (needs better glycemic control)-BS 200s   -a1c 7.9   -SSI  -lantus 20U      HTN  -monitor BP  -home meds      H/o CAD  -acei, bb   -will hold asa until surgical procedure completed     Morbid obesity  -educate diet     Physical Exam:    General appearance: alert, cooperative, no distress  Lungs: clear to auscultation bilaterally  Heart: regular rate and rhythm, S1, S2 normal  Abdomen: soft, diffuse tenderness, nondistended. Normoactive bowel sounds. Incision susan-intact   Extremities: extremities normal, atraumatic, no cyanosis or edema  Skin: Skin color, texture, turgor normal.   Neurologic: Grossly normal  PSY: Mood and affect normal, appropriately behaved    Significant Diagnostic Studies:     Exam:  CT of the chest, abdomen and pelvis     Indication:  Severe abdominal pain     Comparison:  none     Technique:  A CT scan of the abdomen and pelvis was performed. Axial images  were obtained from the base of the chest thru the pubic symphysis.   The study  was performed without the administration of either oral or intravenous contrast  material.  Multiplanar reconstructions were performed.     Dose reduction techniques used: Automated exposure control, adjustment of the  mAs and/or kVp according to patient's size, and/or iterative reconstruction  techniques. Techniques utilized are listed in the medical record.     Findings:     Lung base:  Scattered atelectasis     Liver: Normal in size and attenuation showing no masses to suggest malignancy,  either primary or metastatic.     Biliary System: Normal in appearance.     Spleen: Normal in size and attenuation.     Pancreas: There is stranding of the fat adjacent to the pancreas and extending  to the perisplenic region. Stranding of the fat is also noted inferiorly along  the retroperitoneum anterior to the aorta and IVC. The overall appearance is  consistent with acute pancreatitis.     Adrenal glands: Surgical clips are evident in the right adrenal region. No  adrenal mass is demonstrated.     Kidneys: Normal in appearance bilaterally, showing no evidence of malignancy or  obstruction.     Vascular Structures: Normal in caliber.     Lymph nodes: Normal. No adenopathy.     GI tract: The appendix is normal. Scattered colonic diverticula are evident.     Pelvic Organs: Normal in appearance.       Osseous structures: Spondylosis. Lumbar spinal stenosis is present     Other:  None        IMPRESSION  IMPRESSION:     Fat stranding in the retroperitoneum is present compatible with acute  pancreatitis     Colonic diverticulosis   EXAM: Limited right upper quadrant abdominal ultrasound     INDICATION: Right upper quadrant pain.     COMPARISON: None.     _______________     FINDINGS:     LIVER: Liver echogenicity is increased suggestive of steatosis. No focal mass  Normal direction of blood flow in the portal vein.     BILIARY SYSTEM: No intrahepatic biliary dilatation.  Common bile duct is normal  in caliber measuring 0.3 cm.     GALLBLADDER: Multiple gallstones are present without wall thickening or  pericholecystic fluid.     RIGHT KIDNEY: 11 cm in length. No hydronephrosis or renal mass. No visible  calculi.     PANCREAS: Obscured by overlying bowel gas.     IVC: Visualized portions are unremarkable in appearance.     OTHER: No free intraperitoneal fluid.     _______________     IMPRESSION  IMPRESSION:        1. Cholelithiasis without evidence of acute cholecystitis        2. Hepatic steatosis     Discharge Medications:     Current Discharge Medication List      CONTINUE these medications which have NOT CHANGED    Details   cholecalciferol (VITAMIN D3) 1,000 unit tablet Take 2 Tabs by mouth daily. Qty: 100 Tab, Refills: 2    Associated Diagnoses: Vitamin D deficiency      metFORMIN (GLUCOPHAGE) 1,000 mg tablet Take 1 Tab by mouth two (2) times daily (with meals). Qty: 60 Tab, Refills: 5    Associated Diagnoses: Uncontrolled type 2 diabetes mellitus with microalbuminuria, with long-term current use of insulin ([facility])      lisinopril (PRINIVIL, ZESTRIL) 40 mg tablet Take 1 Tab by mouth daily. Qty: 30 Tab, Refills: 2    Associated Diagnoses: Essential hypertension with goal blood pressure less than 140/90      FLUoxetine (PROZAC) 20 mg capsule Take 1 Cap by mouth daily. Qty: 30 Cap, Refills: 2    Associated Diagnoses: Moderate single current episode of major depressive disorder ([facility])      aspirin delayed-release 81 mg tablet Take 1 Tab by mouth daily. Qty: 80 Tab, Refills: 2    Associated Diagnoses: Coronary artery disease involving coronary bypass graft of native heart without angina pectoris      gemfibrozil (LOPID) 600 mg tablet Take 1 Tab by mouth two (2) times a day. Qty: 180 Tab, Refills: 1    Associated Diagnoses: Hypertriglyceridemia      furosemide (LASIX) 20 mg tablet Take 1 Tab by mouth daily. Qty: 30 Tab, Refills: 2    Associated Diagnoses: Essential hypertension with goal blood pressure less than 140/90;  Bilateral edema of lower extremity insulin glargine (LANTUS) 100 unit/mL injection 70 Units by SubCUTAneous route daily. Qty: 30 mL, Refills: 5    Associated Diagnoses: Uncontrolled type 2 diabetes mellitus with microalbuminuria, with long-term current use of insulin ([facility])      metoprolol tartrate (LOPRESSOR) 50 mg tablet Take 1 Tab by mouth two (2) times a day. Qty: 60 Tab, Refills: 3    Associated Diagnoses: Essential hypertension with goal blood pressure less than 140/90      glucose blood VI test strips (BLOOD GLUCOSE TEST) strip Use as directed  Qty: 200 Strip, Refills: 5    Associated Diagnoses: Uncontrolled type 2 diabetes mellitus with microalbuminuria, with long-term current use of insulin ([facility])      Blood-Glucose Meter monitoring kit Use as directed  Qty: 1 Kit, Refills: 0    Associated Diagnoses: Uncontrolled type 2 diabetes mellitus with microalbuminuria, with long-term current use of insulin ([facility])      Lancets misc Use as directed  Qty: 200 Each, Refills: 5    Associated Diagnoses: Uncontrolled type 2 diabetes mellitus with microalbuminuria, with long-term current use of insulin ([facility])      albuterol (PROVENTIL HFA, VENTOLIN HFA, PROAIR HFA) 90 mcg/actuation inhaler Take 2 Puffs by inhalation every six (6) hours as needed for Wheezing. Qty: 1 Inhaler, Refills: 3    Associated Diagnoses: Other emphysema ([facility])      clobetasol (TEMOVATE) 0.05 % ointment Apply  to affected area two (2) times a day. Qty: 60 g, Refills: 2    Associated Diagnoses: Dermatitis      Insulin Syringes, Disposable, 1 mL syrg Use once a day. Diagnosis 250.00  Qty: 100 Syringe, Refills: 5    Associated Diagnoses: Type II diabetes mellitus, uncontrolled ([facility])             Activity: Activity as tolerated    Diet: Diabetic Diet; low fat, low cholesterol, no alcohol/caffeine     Wound Care: As directed    Follow-up:     The patient should follow-up with PCP/surgery outpatient within 1 week of discharge in regards to recent hospitalization.  The patient should discuss glycemic control management with PCP. Patient to arrange.      Discharge time > 35 mins   Quinten Rivers NP  8/29/2018, 10:18 AM

## 2018-08-29 NOTE — PROGRESS NOTES
Problem: Mobility Impaired (Adult and Pediatric) Goal: *Acute Goals and Plan of Care (Insert Text) Outcome: Resolved/Met Date Met: 08/29/18 PHYSICAL THERAPY: Initial Assessment/Discharge INPATIENT: Commercial: Hospital Day: 5 Patient: Jl Saldaña (1 y.o. male)    Date: 8/29/2018 Primary Diagnosis: Pancreatitis 
pancreatitis Procedure(s) (LRB): Attempt at davinci CHOLECYSTECTOMY MULTIPORT ROBOTIC ASSISTED XI (N/A), 1 Day Post-Op Precautions: Fall PLOF: Independent ASSESSMENT AND RECOMMENDATIONS: 
Based on the objective data described below, the patient presents with mobility level appropriate for discharge to home. Seated in chair upon entry. Fully dressed. Expressed frustration over prolonged discharge process; offered therapeutic listening. Mod I for sit to stand. Amb 50ft x2 with ww; mod I. Cues for safety with turns and end of amb distance. Returned to supine in bed mod I. Good safety awareness with transfer with ww. May benefit from ww in short term for return home; RN Hanna Easton aware. All needs in reach at end of session. Skilled physical therapy is not indicated at this time. EDUCATION:  
Education:  Patient was educated on the following topics: Bed mobility, transfers, ADLs, balance, amb, safety, exercise, role of PT, plan of care, cognition, skin integrity, vitals Barriers to Learning/Limitations: yes;  cognitive Compensate with: visual, verbal, tactile, kinesthetic cues/mode Discharge Recommendations: None Further Equipment Recommendations for Discharge: rolling walker SUBJECTIVE:  
Patient stated I want to walk out of here.  OBJECTIVE DATA SUMMARY:  
 
Past Medical History:  
Diagnosis Date  Coronary artery disease  Depression  Diabetes ([facility] [address]  DM (diabetes mellitus screen)  Heart disease  Hyperlipidemia  Kidney calculi Past Surgical History:  
Procedure Laterality Date  HX OTHER SURGICAL    
 pt stated he had a triple bypass  HX OTHER SURGICAL    
 excision of right benign adrenal mass Eval Complexity: History: MEDIUM  Complexity : 1-2 comorbidities / personal factors will impact the outcome/ POC Exam:MEDIUM Complexity : 3 Standardized tests and measures addressing body structure, function, activity limitation and / or participation in recreation  Presentation: MEDIUM Complexity : Evolving with changing characteristics  Clinical Decision Making:Medium Complexity [facility] Standing Balance Scale 3+/5 Overall Complexity:MEDIUM 
 
G CODE:  Mobility [id number] Current  CJ= 20-39%  D/C  CJ= 20-39%. The severity rating is based on the Other [facility] Balance Scale 3+/5 [address] Standing Balance Scale 3+/5 
0: Pt performs 25% or less of standing activity (Max assist) CN, 100% impaired. 1: Pt supports self with upper extremities but requires therapist assistance. Pt performs 25-50% of effort (Mod assist) CM, 80% to <100% impaired. 1+: Pt supports self with upper extremities but requires therapist assistance. Pt performs >50% effort. (Min assist). CL, 60% to <80% impaired. 2: Pt supports self independently with both upper extremities (walker, crutches, parallel bars). CL, 60% to <80% impaired. 2+: Pt support self independently with 1 upper extremity (cane, crutch, 1 parallel bar). CK, 40% to <60% impaired. 3: Pt stands without upper extremity support for up to 30 seconds. CK, 40% to <60% impaired. 3+: Pt stands without upper extremity support for 30 seconds or greater. CJ, 20% to <40% impaired. 4: Pt independently moves and returns center of gravity 1-2 inches in one plane. CJ, 20% to <40% impaired. 4+: Pt independently moves and returns center of gravity 1-2 inches in multiple planes. CI, 1% to <20% impaired. 5: Pt independently moves and returns center of gravity in all planes greater than 2 inches. CH, 0% impaired. Prior Level of Function/Home Situation: Resides in group home; independent prior to admission Home Situation Home Environment: Group home One/Two Story Residence:  (bedroom 1st floor) Living Alone: No 
Support Systems: Home care staff Patient Expects to be Discharged to[de-identified] Group home Current DME Used/Available at Home: None Critical Behavior: 
Neurologic State: Alert Orientation Level: Oriented X4 Cognition: Follows commands Safety/Judgement: Fall prevention Psychosocial 
Patient Behaviors: Cooperative Manual Muscle Testing (LE) 
       R     L Hip Flexion:   5/5 5/5 Knee EXT:   5/5 5/5 Knee FLEX:   5/5 5/5 Ankle DF:   5/5 5/5 
_________________________________________________ Tone : BLE normal 
Sensation: not assessed Range Of Motion: BLE AROM HEBER/Matter and Form [place] Functional Mobility: 
 
 
Functional Status Indep (I) Mod I Super-vision Min A Mod A Max A Total A Assist x2 Verbal cues Additional time Not tested Comments Rolling []  []  [] []    []    []  []  [] [] [] [x] Supine to sit []  []  [] []  []  []  []  [] [] [] [x] Sit to supine []  [x]  [] []  []  []  []  [] [] [] [] Sit to stand []  [x]  [] []  []  []  []  [] [] [] []   
Stand to sit []  [x]  [] []  []  []  []  [] [] [] [] Bed to chair transfers []  []  [] []  []  []  []  [] [] [] [x] Balance Good Arminda Carreon Poor Unable Not tested Comments Sitting static [x]  []  []  []  [] Sitting dynamic [x]  []  []  []  []   
Standing static [x]  []  []  []  []   
Standing dynamic []  [x]  []  []  [] Mobility/Gait:  
Level of Assistance: Modified independent Assistive Device: rolling walker Distance Ambulated: 50 feet x2 Pain: 
Pre treatment pain: 0 Post treatment pain: 0 Pain Scale 1: Numeric (0 - 10) Pain Intensity 1: 0 Activity Tolerance:   
Good Please refer to the flowsheet for vital signs taken during this treatment. After treatment: []         Patient left in no apparent distress sitting up in chair 
[x]         Patient left in no apparent distress in bed 
[x]         Call bell left within reach [x]         Nursing notified 
[]         Caregiver present 
[]         Bed alarm activated COMMUNICATION/EDUCATION:  
[x]         Fall prevention education was provided and the patient/caregiver indicated understanding. [x]         Patient/family have participated as able in goal setting and plan of care. [x]         Patient/family agree to work toward stated goals and plan of care. []         Patient understands intent and goals of therapy, but is neutral about his/her participation. []         Patient is unable to participate in goal setting and plan of care. Thank you for this referral. 
Rolando De La O, PT Time Calculation: 20 mins

## 2018-08-29 NOTE — PROGRESS NOTES
2000 Patient received in bed resting quietly eating dinner. No complaints voiced at present. Call light in reach & side rails up x3. Patient instructed not to get out of bed without any assistance. IV infiltrate. Attempted IV restart but unable to restart IV. Nursing supervisor notified. 2200 ICU nurse unable to restart IV. Will have someone attempt to restart IV. 
 
0100 IV restarted by ER nurse. In bed resting quietly. 0600 Slept most of the night. No complaints voiced. Encourage to use ICS.

## 2018-08-29 NOTE — PROGRESS NOTES
Patient received in bed resting and easily awaken. Patient A&Ox4, denies pain and discomfort. No distress noted. Frequently use items within reach. Bed locked in low position. Call bell within reach and Patient verbalized understanding of use for assistance and needs. 1227- Patient continues to be up in chair, made aware of ambulating order; voiced understanding. Sat's 93% 2LNC. Call bell w/in reach. 1240- Patient standing with this nurse at him side holding his arm and CNA with w/c in place standing behind Patient for assistance, if needed. Patient ambulated from bed to doorway, gait slow and Patient swayed downward briefly. Sat's 89% RA. Returned to bed. 
 
1245- Patient's oxygen reapplied; post activity sat's 93% 2LNC. Call bell w/in reach. 1337- Patient ambulating to hallway with standby with assistance and CNA with w/c in place behind Patient for assistance. Patient ambulated from bed to hallway, then return to bed; gait slow; sat's improved 94% RA. Returned to bed. Call bell w/in reach.

## 2018-08-29 NOTE — ROUTINE PROCESS
Bedside and Verbal shift change report given to Irina Mclaughlin, RN (oncoming nurse) by Fabiola Kellogg RN, BSN (offgoing nurse). Report given with SBAR, Kardex, Intake/Output, MAR and Recent Results.

## 2018-08-29 NOTE — ROUTINE PROCESS
Bedside and Verbal shift change report given to [address] (oncoming nurse) by Dante Simms RN 
 (offgoing nurse). Report included the following information SBAR, Kardex and MAR.

## 2018-08-29 NOTE — OP NOTES
[address] REPORT    Levi Cao  MR#: [id number]  : 1960  ACCOUNT #: [de-identified]   DATE OF SERVICE: 2018    SURGEON:  Heaven Burgess MD    ASSISTANT:  Karyn Oliver    PREOPERATIVE DIAGNOSIS:  Gallstone pancreatitis. POSTOPERATIVE DIAGNOSIS:  Severe adhesion. PROCEDURE PERFORMED:  Robotic exploration, lysis of adhesion, a portion of the cholecystectomy. ANESTHESIA:  General.    COMPLICATIONS:  None. SPECIMENS REMOVED:  None. ESTIMATED BLOOD LOSS:  Minimal.    IMPLANTS: None. DETAILS OF PROCEDURE:  The patient was brought to operating room. Anesthesia was induced, scrubbing and draping of the abdomen was in the usual manner. A timeout was performed. A skin incision in the supraumbilical area was performed. Veress needle was inserted. Abdomen was insufflated. A 12 mm port was inserted. Three other 8 mm ports were inserted. At this point, there were severe adhesions in the right upper quadrant. We noticed that the patient had a previous scar in the right upper quadrants which are probably from his kidney surgery. We did lyse the right lesion. I was not able to identify the gallbladder. I attempted multiple times due to extensive lysis of the lesion, but there was severe adhesion between the liver and the abdominal fold. I was concern of causing damage. Given the fact that the patient had gallstone pancreatitis and acute cholecystitis, I decided to abort the surgery and if the patient has another attack, then we will proceed with an open surgery. So at this poin, the surgery was aborted and instruments were removed. The robot was undocked. The skin incisions were closed with 4-0 Monocryl.       MD Maury Crane  D: 2018 17:48     T: 2018 22:01  JOB #: [id number]

## 2018-09-04 NOTE — PROGRESS NOTES
09/04/18 I gave Ms Rustam Magaña ( [phone]) the phone number to Medical Records to obtain surgical notes from last week. Leatha Cranker. [facility] [phone], beeper [phone]

## 2018-09-04 NOTE — PROGRESS NOTES
09/04/18 Chart opened for questions from [facility] [address] from [facility].

## 2021-08-09 NOTE — TELEPHONE ENCOUNTER
Meds sent.
Patient call patient regarding the prescriptions.  Patient needs to have the medications printed out on a script
Patient called checking status on refill request. I explained to him that it is in the process please wait for call from nurse telling him prescriptions are ready for .
Requested Prescriptions     Pending Prescriptions Disp Refills    metFORMIN (GLUCOPHAGE) 1,000 mg tablet 120 Tab 3     Sig: Take 1 Tab by mouth two (2) times daily (with meals).  FLUoxetine (PROZAC) 20 mg capsule 30 Cap 2     Sig: Take 1 Cap by mouth daily.  lisinopril (PRINIVIL, ZESTRIL) 40 mg tablet 30 Tab 2     Sig: Take 1 Tab by mouth daily.      Last office visit: 03/21/17  Next office visit: 05/22/17
show

## 2024-10-27 NOTE — MR AVS SNAPSHOT
Visit Information Date & Time Provider Department Dept. Phone Encounter #  
 3/21/2017 10:45 AM [address] Jenkins, [address] [phone] [id number] Follow-up Instructions Return in about 2 months (around 5/21/2017) for Dr MILLER Upcoming Health Maintenance Date Due Hepatitis C Screening 1960 FOOT EXAM Q1 9/24/1970 EYE EXAM RETINAL OR DILATED Q1 9/24/1970 Pneumococcal 19-64 Medium Risk (1 of 1 - PPSV23) 9/24/1979 DTaP/Tdap/Td series (1 - Tdap) 9/24/1981 FOBT Q 1 YEAR AGE 50-75 9/24/2010 INFLUENZA AGE 9 TO ADULT 8/1/2016 MICROALBUMIN Q1 8/24/2017 HEMOGLOBIN A1C Q6M 9/2/2017 LIPID PANEL Q1 3/2/2018 Allergies as of 3/21/2017  Review Complete On: 3/21/2017 By: [id number] PHIL Jenkins MD  
 No Known Allergies Current Immunizations  Never Reviewed No immunizations on file. Not reviewed this visit You Were Diagnosed With   
  
 Codes Comments DM type 2, goal HbA1c < 7% (HCC)    -  Primary ICD-10-CM: E11.9 ICD-9-CM: 250.00 Essential hypertension with goal blood pressure less than 140/90     ICD-10-CM: I10 
ICD-9-CM: 401.9 Psoriasis     ICD-10-CM: L40.9 ICD-9-CM: 696.1 Vitals BP Pulse Temp Resp Height(growth percentile) Weight(growth percentile) (!) 187/107 (BP 1 Location: Right arm, BP Patient Position: At rest) 73 97.1 °F (36.2 °C) (Oral) 17 6' 1\" (1.854 m) 322 lb (146.1 kg) SpO2 BMI Smoking Status 97% 42.48 kg/m2 Current Every Day Smoker Vitals History BMI and BSA Data Body Mass Index Body Surface Area  
 42.48 kg/m 2 2.74 m 2 Your Updated Medication List  
  
   
This list is accurate as of: 3/21/17 11:47 AM.  Always use your most recent med list.  
  
  
  
  
 albuterol 90 mcg/actuation inhaler Commonly known as:  PROVENTIL HFA, VENTOLIN HFA, PROAIR HFA Take 2 Puffs by inhalation every six (6) hours as needed for Wheezing. aspirin delayed-release 81 mg tablet Take  by mouth daily. atorvastatin 80 mg tablet Commonly known as:  LIPITOR Take 1 Tab by mouth daily. cholecalciferol 1,000 unit tablet Commonly known as:  VITAMIN D3 Take 2 Tabs by mouth daily. clobetasol 0.05 % ointment Commonly known as:  Izetta Zhong Apply  to affected area two (2) times a day. FLUoxetine 20 mg capsule Commonly known as:  PROzac Take 1 Cap by mouth daily. hydroCHLOROthiazide 25 mg tablet Commonly known as:  HYDRODIURIL Take 1 Tab by mouth daily. insulin glargine 100 unit/mL injection Commonly known as:  LANTUS  
65 Units by SubCUTAneous route daily. Insulin Syringes (Disposable) 1 mL Syrg Use once a day. Diagnosis 250.00  
  
 lisinopril 40 mg tablet Commonly known as:  Irene Marlena Take 1 Tab by mouth daily. metFORMIN 1,000 mg tablet Commonly known as:  GLUCOPHAGE Take 1 Tab by mouth two (2) times daily (with meals). metoprolol tartrate 50 mg tablet Commonly known as:  LOPRESSOR Take 1 Tab by mouth two (2) times a day. sAXagliptin 5 mg Tab tablet Commonly known as:  ONGLYZA Take 1 Tab by mouth daily. Prescriptions Printed Refills  
 metoprolol tartrate (LOPRESSOR) 50 mg tablet 3 Sig: Take 1 Tab by mouth two (2) times a day. Class: Print Route: Oral  
 sAXagliptin (ONGLYZA) 5 mg tab tablet 3 Sig: Take 1 Tab by mouth daily. Class: Print Route: Oral  
 hydroCHLOROthiazide (HYDRODIURIL) 25 mg tablet 3 Sig: Take 1 Tab by mouth daily. Class: Print Route: Oral  
  
We Performed the Following REFERRAL TO DERMATOLOGY [REF19 Custom] Comments:  
 Please evaluate patient for  psoriasis Follow-up Instructions Return in about 2 months (around 5/21/2017) for Dr FERNANDEZ. Referral Information Referral ID Referred By Referred To  
  
 [id number] Dariel ERWIN Not Available Visits Status Start Date End Date 1 New Request 3/21/17 3/21/18 If your referral has a status of pending review or denied, additional information will be sent to support the outcome of this decision. Introducing [facility]! Dear Irma Mullins: 
Thank you for requesting a [employer] account. Our records indicate that you already have an active [employer] account. You can access your account anytime at [email or web address] Did you know that you can access your hospital and ER discharge instructions at any time in [employer]? You can also review all of your test results from your hospital stay or ER visit. Additional Information If you have questions, please visit the Frequently Asked Questions section of the [employer] website at [email or web address] Remember, [employer] is NOT to be used for urgent needs. For medical emergencies, dial 911. Now available from your iPhone and Android! Please provide this summary of care documentation to your next provider. Your primary care clinician is listed as Tino Corcoran. If you have any questions after today's visit, please call [phone]. - - -

## (undated) DEVICE — SOL ANTI-FOG 6ML MEDC -- MEDICHOICE - CONVERT TO 358427

## (undated) DEVICE — REM POLYHESIVE ADULT PATIENT RETURN ELECTRODE: Brand: VALLEYLAB

## (undated) DEVICE — DRAPE,UTILITY,TAPE,15X26,STERILE: Brand: MEDLINE

## (undated) DEVICE — CARTRIDGE CLP LIG HEMLOK GRN --

## (undated) DEVICE — ARM DRAPE

## (undated) DEVICE — DERMABOND SKIN ADH 0.7ML -- DERMABOND ADVANCED 12/BX

## (undated) DEVICE — STERILE POLYISOPRENE POWDER-FREE SURGICAL GLOVES: Brand: PROTEXIS

## (undated) DEVICE — SEAL UNIV 5-8MM DISP BX/10 -- DA VINCI XI - SNGL USE

## (undated) DEVICE — COVER LT HNDL BLU PLAS

## (undated) DEVICE — NEEDLE HYPO 25GA L1.5IN BVL ORIENTED ECLIPSE

## (undated) DEVICE — 12 ML SYRINGE,LUER-LOCK TIP: Brand: MONOJECT

## (undated) DEVICE — BLADELESS OPTICAL TROCAR WITH FIXATION CANNULA: Brand: VERSAPORT

## (undated) DEVICE — LIGHT HANDLE: Brand: DEVON

## (undated) DEVICE — INTENDED FOR TISSUE SEPARATION, AND OTHER PROCEDURES THAT REQUIRE A SHARP SURGICAL BLADE TO PUNCTURE OR CUT.: Brand: BARD-PARKER ® CARBON RIB-BACK BLADES

## (undated) DEVICE — Device

## (undated) DEVICE — BLADELESS OBTURATOR: Brand: WECK VISTA

## (undated) DEVICE — SUTURE MCRYL SZ 4-0 L18IN ABSRB UD L19MM PS-2 3/8 CIR PRIM Y496G

## (undated) DEVICE — COLUMN DRAPE

## (undated) DEVICE — SPECIMEN RETRIEVAL POUCH: Brand: ENDO CATCH GOLD

## (undated) DEVICE — (D)PREP SKN CHLRAPRP APPL 26ML -- CONVERT TO ITEM 371833

## (undated) DEVICE — BLADELESS OPTICAL TROCAR WITH FIXATION CANNULA: Brand: VERSAONE

## (undated) DEVICE — SOL IRR NACL 0.9% 500ML POUR --

## (undated) DEVICE — KENDALL SCD EXPRESS SLEEVES, KNEE LENGTH, MEDIUM: Brand: KENDALL SCD

## (undated) DEVICE — INSULATED BLADE ELECTRODE: Brand: EDGE